# Patient Record
Sex: MALE | Race: WHITE | NOT HISPANIC OR LATINO | Employment: FULL TIME | URBAN - METROPOLITAN AREA
[De-identification: names, ages, dates, MRNs, and addresses within clinical notes are randomized per-mention and may not be internally consistent; named-entity substitution may affect disease eponyms.]

---

## 2017-02-20 DIAGNOSIS — E78.5 HYPERLIPIDEMIA LDL GOAL <130: ICD-10-CM

## 2017-02-20 NOTE — TELEPHONE ENCOUNTER
Atorvastatin 80 mg     Last Written Prescription Date: 01/18/2016  Last Fill Quantity: 90, # refills: 3  Last Office Visit with G, P or [facility] prescribing provider: 02/16/2016       Lab Results   Component Value Date    CHOL 281 11/28/2015     Lab Results   Component Value Date    HDL 49 11/28/2015     Lab Results   Component Value Date     11/28/2015     Lab Results   Component Value Date    TRIG 167 11/28/2015     Lab Results   Component Value Date    CHOLHDLRATIO 5.3 10/18/2014

## 2017-02-21 RX ORDER — ATORVASTATIN CALCIUM 80 MG/1
TABLET, FILM COATED ORAL
Qty: 30 TABLET | Refills: 0 | Status: SHIPPED | OUTPATIENT
Start: 2017-02-21 | End: 2017-04-27 | Stop reason: ALTCHOICE

## 2017-02-21 NOTE — TELEPHONE ENCOUNTER
Medication is being filled for 1 time refill only due to:  Patient needs labs FLP. Patient needs to be seen because it has been more than one year since last visit. please call to help schedule.  Denisse Nelson RN, BSN

## 2017-02-22 NOTE — TELEPHONE ENCOUNTER
Called and spoke to the patient he is aware that he needs labs and office visit before refill approved, he will call back to schedule  Amada Walker RT(R)

## 2017-03-31 ENCOUNTER — TELEPHONE (OUTPATIENT)
Dept: FAMILY MEDICINE | Facility: OTHER | Age: 42
End: 2017-03-31

## 2017-03-31 NOTE — TELEPHONE ENCOUNTER
Panel Management Review      Patient has the following on his problem list: None      Composite cancer screening  Chart review shows that this patient is due/due soon for the following None  Summary:    Patient is due/failing the following:   Lipids    Action needed:   Patient needs office visit for follow up and fasting labs.    Type of outreach:    Patient's wife answered. Said she'd take a message so asked her to have patient call back.  She said if this is in regards to his labs that he is trying to get in on the weekend to get his fasting labs done before he makes a follow up appointment with Dr. Almonte.  She said he's wokring on it.    Questions for provider review:    None                                                                                                                                    Skyla Bernardo, [facility]       Chart routed to Closed   .

## 2017-04-22 DIAGNOSIS — E78.5 HYPERLIPIDEMIA LDL GOAL <130: ICD-10-CM

## 2017-04-22 LAB
CHOLEST SERPL-MCNC: 243 MG/DL
HDLC SERPL-MCNC: 48 MG/DL
LDLC SERPL CALC-MCNC: 178 MG/DL
NONHDLC SERPL-MCNC: 195 MG/DL
TRIGL SERPL-MCNC: 85 MG/DL

## 2017-04-22 PROCEDURE — 36415 COLL VENOUS BLD VENIPUNCTURE: CPT | Performed by: FAMILY MEDICINE

## 2017-04-22 PROCEDURE — 80061 LIPID PANEL: CPT | Performed by: FAMILY MEDICINE

## 2017-04-23 NOTE — PROGRESS NOTES
Cholesterol is a bit better, but still not ideal.  Have you ever tried Crestor.  It might be a better choice than the Lipitor to keep this under control.  Let me know what you'd like to do.    Thanks,    Dr. Almonte

## 2017-04-27 ENCOUNTER — TELEPHONE (OUTPATIENT)
Dept: FAMILY MEDICINE | Facility: OTHER | Age: 42
End: 2017-04-27

## 2017-04-27 DIAGNOSIS — E78.5 HYPERLIPIDEMIA LDL GOAL <130: ICD-10-CM

## 2017-04-27 RX ORDER — ROSUVASTATIN CALCIUM 40 MG/1
40 TABLET, COATED ORAL DAILY
Qty: 30 TABLET | Refills: 1 | Status: SHIPPED | OUTPATIENT
Start: 2017-04-27 | End: 2017-07-08

## 2017-04-27 NOTE — TELEPHONE ENCOUNTER
Reason for Call:  Medication or medication refill:    Do you use a [facility]?  Name of the pharmacy and phone number for the current request:  [facility] - [phone]    Name of the medication requested: Crestor     Other request: Pt wife calling stating rec'd a letter from [facility], stating that the Crestor would be a better option vs Lipator.  She is inquiring if a script needs to be requested, or will pharmacy have it? Pt is fine with changing med.    Can we leave a detailed message on this number? YES    Phone number patient can be reached at: Cell number on file:    Telephone Information:   Mobile [phone]       Best Time: anytime    Call taken on 4/27/2017 at 9:28 AM by Snehal Nava

## 2017-04-27 NOTE — TELEPHONE ENCOUNTER
I have sent new Rx to [facility].  We should recheck labs in a couple months.  Let me know if problems with the change.

## 2017-07-08 DIAGNOSIS — E78.5 HYPERLIPIDEMIA LDL GOAL <130: ICD-10-CM

## 2017-07-10 NOTE — TELEPHONE ENCOUNTER
rosuvastatin (CRESTOR) 40 MG tablet     Last Written Prescription Date: 4/27/2017  Last Fill Quantity: 30, # refills: 1  Last Office Visit with G, [facility] or [facility] prescribing provider: 2/16/20116       Lab Results   Component Value Date    CHOL 243 04/22/2017     Lab Results   Component Value Date    HDL 48 04/22/2017     Lab Results   Component Value Date     04/22/2017     Lab Results   Component Value Date    TRIG 85 04/22/2017     Lab Results   Component Value Date    CHOLHDLRATIO 5.3 10/18/2014

## 2017-07-11 NOTE — TELEPHONE ENCOUNTER
Routing refill request to provider for review/approval because:  Labs out of range:  LDL  Patient needs to be seen because it has been more than 1 year since last office visit. LOV: 2/16/16    Patricia Guerrero RN, BSN

## 2017-07-12 RX ORDER — ROSUVASTATIN CALCIUM 40 MG/1
TABLET, COATED ORAL
Qty: 30 TABLET | Refills: 0 | Status: SHIPPED | OUTPATIENT
Start: 2017-07-12 | End: 2017-08-30

## 2017-07-12 NOTE — TELEPHONE ENCOUNTER
Your medication has been refilled.  Please schedule a fasting labs and office visit to follow up on how this medication is working for you before your next refill.  We need to be sure everything is working well and stable prior to further refills.

## 2017-08-12 DIAGNOSIS — E78.5 HYPERLIPEMIA: Primary | ICD-10-CM

## 2017-08-12 LAB
CHOLEST SERPL-MCNC: 154 MG/DL
HDLC SERPL-MCNC: 47 MG/DL
LDLC SERPL CALC-MCNC: 91 MG/DL
NONHDLC SERPL-MCNC: 107 MG/DL
TRIGL SERPL-MCNC: 79 MG/DL

## 2017-08-12 PROCEDURE — 80061 LIPID PANEL: CPT | Performed by: FAMILY MEDICINE

## 2017-08-12 PROCEDURE — 36415 COLL VENOUS BLD VENIPUNCTURE: CPT | Performed by: FAMILY MEDICINE

## 2017-08-12 NOTE — LETTER
Maverick Negrontz  [address] [address] [place] [id number]        August 14, 2017          Dear ,    We are writing to inform you of your test results.    Labs look much better.  Medication and lifestyle changes are working well at this time.     Resulted Orders   Lipid panel reflex to direct LDL   Result Value Ref Range    Cholesterol 154 <200 mg/dL    Triglycerides 79 <150 mg/dL    HDL Cholesterol 47 >39 mg/dL    LDL Cholesterol Calculated 91 <100 mg/dL      Comment:      Desirable:       <100 mg/dl    Non HDL Cholesterol 107 <130 mg/dL       If you have any questions or concerns, please call the clinic at the number listed above.       Sincerely,        Jovani Almonte MD

## 2017-08-14 NOTE — PROGRESS NOTES
Labs look much better.  Medication and lifestyle changes are working well at this time.    Have a nice day!    Dr. Almonte

## 2017-08-24 DIAGNOSIS — E78.5 HYPERLIPIDEMIA LDL GOAL <130: ICD-10-CM

## 2017-08-24 NOTE — TELEPHONE ENCOUNTER
rosuvastatin (CRESTOR) 40 MG tablet     Last Written Prescription Date: 7/12/17  Last Fill Quantity: 30, # refills: 0  Last Office Visit with G, P or [facility] prescribing provider: 2/16/16       Lab Results   Component Value Date    CHOL 154 08/12/2017     Lab Results   Component Value Date    HDL 47 08/12/2017     Lab Results   Component Value Date    LDL 91 08/12/2017     Lab Results   Component Value Date    TRIG 79 08/12/2017     Lab Results   Component Value Date    CHOLHDLRATIO 5.3 10/18/2014

## 2017-08-28 NOTE — TELEPHONE ENCOUNTER
Unable to refill until pt schedules visit for follow up.  We haven't seen him for over a year.  Once scheduled, will fill enough to get to that appointment.

## 2017-08-28 NOTE — TELEPHONE ENCOUNTER
rosuvastatin (CRESTOR) 40 MG tablet  Routing refill request to provider for review/approval because:  Coty given x1 and patient did not follow up, please advise - have completed lab work   Patient needs to be seen because:  Due for physical   Patient needs to be seen because it has been more than 1 year since last office visit. LOV 02/16/2016    Quyen Jackson RN, BSN

## 2017-08-28 NOTE — TELEPHONE ENCOUNTER
Spoke with patients wife, she states that patient is very busy at work, working 12-14 hour days for the next 3-4 weeks, she will have patient call to schedule appointment. He has completed lab work, he has been out of medication for 3 days now. I explained to her that once he schedules appointment we can refill to get him through to that appointment, she understands and will call to schedule  Shelby Walker RT (R)

## 2017-08-29 RX ORDER — ROSUVASTATIN CALCIUM 40 MG/1
TABLET, COATED ORAL
Qty: 30 TABLET | Refills: 0 | OUTPATIENT
Start: 2017-08-29

## 2017-08-29 NOTE — TELEPHONE ENCOUNTER
Please close encounter, patients wife is aware that appointment is needed  Shelby Walker RT (R)

## 2017-08-30 DIAGNOSIS — E78.5 HYPERLIPIDEMIA LDL GOAL <130: ICD-10-CM

## 2017-08-30 RX ORDER — ROSUVASTATIN CALCIUM 40 MG/1
40 TABLET, COATED ORAL DAILY
Qty: 30 TABLET | Refills: 0 | Status: SHIPPED | OUTPATIENT
Start: 2017-08-30 | End: 2017-10-05

## 2017-08-30 RX ORDER — ROSUVASTATIN CALCIUM 40 MG/1
TABLET, COATED ORAL
Qty: 30 TABLET | Refills: 0 | Status: CANCELLED | OUTPATIENT
Start: 2017-08-30

## 2017-08-30 NOTE — TELEPHONE ENCOUNTER
crestor     Last Written Prescription Date: 07/12/17  Last Fill Quantity: 30, # refills: 0  Last Office Visit with G, [facility] or  [facility] prescribing provider: 02/16/16  Next 5 appointments (look out 90 days)     Sep 21, 2017  3:15 PM CDT   [facility]    [address] [facility] [address] [phone]                   Lab Results   Component Value Date    CHOL 154 08/12/2017     Lab Results   Component Value Date    HDL 47 08/12/2017     Lab Results   Component Value Date    LDL 91 08/12/2017     Lab Results   Component Value Date    TRIG 79 08/12/2017     Lab Results   Component Value Date    CHOLHDLRATIO 5.3 10/18/2014

## 2017-08-30 NOTE — TELEPHONE ENCOUNTER
Patient made appointment for 9/21/17 with Dr Almonte would like to get enough to make it to appoinitment.  Please call

## 2017-10-05 DIAGNOSIS — E78.5 HYPERLIPIDEMIA LDL GOAL <130: ICD-10-CM

## 2017-10-10 RX ORDER — ROSUVASTATIN CALCIUM 40 MG/1
TABLET, COATED ORAL
Qty: 7 TABLET | Refills: 0 | Status: SHIPPED | OUTPATIENT
Start: 2017-10-10 | End: 2017-10-31

## 2017-10-31 ENCOUNTER — OFFICE VISIT (OUTPATIENT)
Dept: FAMILY MEDICINE | Facility: CLINIC | Age: 42
End: 2017-10-31
Payer: COMMERCIAL

## 2017-10-31 VITALS
OXYGEN SATURATION: 98 % | WEIGHT: 247 LBS | HEIGHT: 70 IN | HEART RATE: 66 BPM | BODY MASS INDEX: 35.36 KG/M2 | DIASTOLIC BLOOD PRESSURE: 68 MMHG | TEMPERATURE: 98.1 F | RESPIRATION RATE: 18 BRPM | SYSTOLIC BLOOD PRESSURE: 118 MMHG

## 2017-10-31 DIAGNOSIS — Z23 NEED FOR PROPHYLACTIC VACCINATION AND INOCULATION AGAINST INFLUENZA: ICD-10-CM

## 2017-10-31 DIAGNOSIS — Z82.49 FAMILY HISTORY OF ISCHEMIC HEART DISEASE: ICD-10-CM

## 2017-10-31 DIAGNOSIS — E78.5 HYPERLIPIDEMIA LDL GOAL <130: Primary | ICD-10-CM

## 2017-10-31 PROCEDURE — 90471 IMMUNIZATION ADMIN: CPT | Performed by: FAMILY MEDICINE

## 2017-10-31 PROCEDURE — 90686 IIV4 VACC NO PRSV 0.5 ML IM: CPT | Performed by: FAMILY MEDICINE

## 2017-10-31 PROCEDURE — 99213 OFFICE O/P EST LOW 20 MIN: CPT | Mod: 25 | Performed by: FAMILY MEDICINE

## 2017-10-31 RX ORDER — ROSUVASTATIN CALCIUM 40 MG/1
40 TABLET, COATED ORAL DAILY
Qty: 90 TABLET | Refills: 3 | Status: SHIPPED | OUTPATIENT
Start: 2017-10-31 | End: 2018-11-26

## 2017-10-31 ASSESSMENT — PAIN SCALES - GENERAL: PAINLEVEL: NO PAIN (0)

## 2017-10-31 NOTE — MR AVS SNAPSHOT
"              After Visit Summary   10/31/2017    Maverick Youngblood    MRN: [id number]           Patient Information     Date Of Birth          1975        Visit Information        Provider Department      10/31/2017 2:30 PM Quirino Meyers MD [facility]        Today's Diagnoses     Hyperlipidemia LDL goal <130           Follow-ups after your visit        Who to contact     If you have questions or need follow up information about today's clinic visit or your schedule please contact [facility] directly at [phone].  Normal or non-critical lab and imaging results will be communicated to you by [employer], letter or phone within 4 business days after the clinic has received the results. If you do not hear from us within 7 days, please contact the clinic through [employer] or phone. If you have a critical or abnormal lab result, we will notify you by phone as soon as possible.  Submit refill requests through [employer] or call your pharmacy and they will forward the refill request to us. Please allow 3 business days for your refill to be completed.          Additional Information About Your Visit        MyChart Information     [employer] gives you secure access to your electronic health record. If you see a primary care provider, you can also send messages to your care team and make appointments. If you have questions, please call your primary care clinic.  If you do not have a primary care provider, please call [phone] and they will assist you.        Care EveryWhere ID     This is your Care EveryWhere ID. This could be used by other organizations to access your [place] medical records  [id number]        Your Vitals Were     Pulse Temperature Respirations Height Pulse Oximetry BMI (Body Mass Index)    66 98.1  F (36.7  C) (Temporal) 18 5' 10\" (1.778 m) 98% 35.44 kg/m2       Blood Pressure from Last 3 Encounters:   10/31/17 118/68   02/16/16 122/70   01/18/16 120/72    Weight " from Last 3 Encounters:   10/31/17 247 lb (112 kg)   02/16/16 251 lb (113.9 kg)   01/18/16 258 lb (117 kg)              Today, you had the following     No orders found for display         Today's Medication Changes          These changes are accurate as of: 10/31/17  2:30 PM.  If you have any questions, ask your nurse or doctor.               Stop taking these medicines if you haven't already. Please contact your care team if you have questions.     ciprofloxacin 500 MG tablet   Commonly known as:  CIPRO   Stopped by:  Quirino Meyers MD                    Primary Care Provider Office Phone # Fax #    Quirino Meyers [phone] [address] [place] [address]        Equal Access to Services     [facility]: Hadii srinath guallpa hadasho Soomaali, waaxda luqadaha, qaybta kaalmada adeegyada, waxsumit mcknight . So [facility] [phone].    ATENCIÓN: Si habla español, tiene a madera disposición servicios gratuitos de asistencia lingüística. [facility] [phone].    We comply with applicable federal civil rights laws and [place] laws. We do not discriminate on the basis of race, color, national origin, age, disability, sex, sexual orientation, or gender identity.            Thank you!     Thank you for choosing [facility]  for your care. Our goal is always to provide you with excellent care. Hearing back from our patients is one way we can continue to improve our services. Please take a few minutes to complete the written survey that you may receive in the mail after your visit with us. Thank you!             Your Updated Medication List - Protect others around you: Learn how to safely use, store and throw away your medicines at www.disposemymeds.org.          This list is accurate as of: 10/31/17  2:30 PM.  Always use your most recent med list.                   Brand Name Dispense Instructions for use Diagnosis    rosuvastatin 40 MG tablet     CRESTOR    7 tablet    TAKE ONE TABLET BY MOUTH EVERY DAY    Hyperlipidemia LDL goal <130

## 2017-10-31 NOTE — NURSING NOTE
"Chief Complaint   Patient presents with     Lipids     medication refilled.        Initial /68  Pulse 66  Temp 98.1  F (36.7  C) (Temporal)  Resp 18  Ht 5' 10\" (1.778 m)  Wt 247 lb (112 kg)  SpO2 98%  BMI 35.44 kg/m2 Estimated body mass index is 35.44 kg/(m^2) as calculated from the following:    Height as of this encounter: 5' 10\" (1.778 m).    Weight as of this encounter: 247 lb (112 kg).  Medication Reconciliation: complete    "

## 2017-10-31 NOTE — PROGRESS NOTES
"  SUBJECTIVE:   Maverick Youngblood is a 41 year old male who presents to clinic today for the following health issues:      Hyperlipidemia Follow-Up      Rate your low fat/cholesterol diet?: good    Taking statin?  Yes, no muscle aches from statin    Other lipid medications/supplements?:  none        Amount of exercise or physical activity: 4-5 days/week for an average of 45-60 minutes    Problems taking medications regularly: No    Medication side effects: none    Diet: regular (no restrictions)        Problem list and histories reviewed & adjusted, as indicated.  Additional history: as documented    Reviewed and updated as needed this visit by clinical staffTobacco  Allergies  Meds  Problems  Med Hx  Surg Hx  Fam Hx  Soc Hx        Reviewed and updated as needed this visit by Provider  Allergies  Meds  Problems         New patient to me.  Establishing care as his other family sees me.  He is on Crestor for severe hyperlipidemia and strong family history of cardiac disease.  Has uncle that  at age 36 of MI.      Lab Results   Component Value Date    CHOL 154 2017     Lab Results   Component Value Date    HDL 47 2017     Lab Results   Component Value Date    LDL 91 2017     Lab Results   Component Value Date    TRIG 79 2017     Lab Results   Component Value Date    CHOLHDLRATIO 5.3 10/18/2014     Tolerating Crestor well.      ROS:  No fevers, chills, nausea, or vomiting.  No chest pain or shortness of breath, or dyspnea on exertion.    OBJECTIVE:                                                    /68  Pulse 66  Temp 98.1  F (36.7  C) (Temporal)  Resp 18  Ht 5' 10\" (1.778 m)  Wt 247 lb (112 kg)  SpO2 98%  BMI 35.44 kg/m2  Body mass index is 35.44 kg/(m^2).  GENERAL APPEARANCE: healthy, alert and no distress  RESP: lungs clear to auscultation - no rales, rhonchi or wheezes  CV: regular rates and rhythm, normal S1 S2, no S3 or S4 and no murmur, click or rub      "     ASSESSMENT/PLAN:                                                        ICD-10-CM    1. Hyperlipidemia LDL goal <130 E78.5 rosuvastatin (CRESTOR) 40 MG tablet     Lipid panel reflex to direct LDL Fasting   2. Family history of ischemic heart disease Z82.49    3. Need for prophylactic vaccination and inoculation against influenza Z23 FLU VAC, SPLIT VIRUS IM > 3 YO (QUADRIVALENT) [80497]     Vaccine Administration, Initial [16552]     PLAN:  1.  Crestor refilled for 1 year.  Next lipid really is due around 8/2018 but he can get it checked at any point he feels appropriate at or before that time and then follow-up with me for health care maintenance visit when he gets this checked.   Otherwise healthy.      Quirino Meyers MD   [facility]

## 2017-10-31 NOTE — PROGRESS NOTES

## 2018-11-26 DIAGNOSIS — E78.5 HYPERLIPIDEMIA LDL GOAL <130: ICD-10-CM

## 2018-11-28 RX ORDER — ROSUVASTATIN CALCIUM 40 MG/1
TABLET, COATED ORAL
Qty: 90 TABLET | Refills: 0 | Status: SHIPPED | OUTPATIENT
Start: 2018-11-28 | End: 2019-04-01

## 2018-11-28 NOTE — TELEPHONE ENCOUNTER
CRESTOR  Last Written Prescription Date:   10/31/17  Last Fill Quantity:90   # refills: 3   Last office visit: 10/31/2017 with prescribing provider:    Future Office Visit:  NONE  LDL goal < 130.  Last checked on 8/12/17 = 91  Due for fasting lipid check and office visit. Will refill for one time- 90 days with info to schedule appt in comment section of RX.............MIHIR Savage

## 2019-04-01 DIAGNOSIS — E78.5 HYPERLIPIDEMIA LDL GOAL <130: ICD-10-CM

## 2019-04-03 NOTE — TELEPHONE ENCOUNTER
"Requested Prescriptions   Pending Prescriptions Disp Refills     rosuvastatin (CRESTOR) 40 MG tablet [Pharmacy Med Name: ROSUVASTATIN CALCIUM 40MG TABS] 90 tablet 0    Last Written Prescription Date:  11/28/18  Last Fill Quantity: 90,  # refills: 0   Last office visit: 10/31/2017 with prescribing provider:     Future Office Visit:     Sig: TAKE ONE TABLET BY MOUTH ONCE DAILY (DUE FOR LIPID CHECK AND OFFICE VISIT)    Statins Protocol Failed - 4/1/2019  2:14 PM       Failed - LDL on file in past 12 months    Recent Labs   Lab Test 08/12/17  0814   LDL 91            Failed - Recent (12 mo) or future (30 days) visit within the authorizing provider's specialty    Patient had office visit in the last 12 months or has a visit in the next 30 days with authorizing provider or within the authorizing provider's specialty.  See \"Patient Info\" tab in inbasket, or \"Choose Columns\" in Meds & Orders section of the refill encounter.             Passed - No abnormal creatine kinase in past 12 months    Recent Labs   Lab Test 02/16/16  1103   *               Passed - Medication is active on med list       Passed - Patient is age 18 or older        Routing refill request to provider for review/approval because:  Labs not current:  LDL  Patient needs to be seen because it has been more than 1 year since last office visit.  T'd up 1 month for provider review.    Will forward to schedulers to schedule patient for OV.  Jory Woods RN          "

## 2019-04-04 RX ORDER — ROSUVASTATIN CALCIUM 40 MG/1
40 TABLET, COATED ORAL DAILY
Qty: 30 TABLET | Refills: 0 | Status: SHIPPED | OUTPATIENT
Start: 2019-04-04 | End: 2019-06-20

## 2019-06-20 DIAGNOSIS — E78.5 HYPERLIPIDEMIA LDL GOAL <130: ICD-10-CM

## 2019-06-21 RX ORDER — ROSUVASTATIN CALCIUM 40 MG/1
TABLET, COATED ORAL
Qty: 30 TABLET | Refills: 0 | Status: SHIPPED | OUTPATIENT
Start: 2019-06-21 | End: 2019-08-13

## 2019-06-21 NOTE — TELEPHONE ENCOUNTER
Refilled x1.  Needs appointment for further refills.  He was already told this 2 months ago with his last refill.  Will have staff notify patient.  PLEASE CONFIRM THAT HE RECEIVED THIS MESSAGE REGARDING NEED FOR APPOINTMENT.      Quirino Meyers MD

## 2019-06-21 NOTE — TELEPHONE ENCOUNTER
"Requested Prescriptions   Pending Prescriptions Disp Refills     rosuvastatin (CRESTOR) 40 MG tablet [Pharmacy Med Name: ROSUVASTATIN CALCIUM 40MG TABS] 30 tablet 0     Sig: TAKE ONE TABLET BY MOUTH ONCE DAILY (APPOINTMENT NEEDED FOR ADDITIONAL REFILLS)   Last Written Prescription Date:  4/4/2019  Last Fill Quantity: 30,  # refills: 0   Last office visit: 10/31/2017 with prescribing provider:  Luigi   Future Office Visit:        Statins Protocol Failed - 6/20/2019 12:53 PM        Failed - LDL on file in past 12 months     Recent Labs   Lab Test 08/12/17  0814   LDL 91           Failed - Recent (12 mo) or future (30 days) visit within the authorizing provider's specialty     Patient had office visit in the last 12 months or has a visit in the next 30 days with authorizing provider or within the authorizing provider's specialty.  See \"Patient Info\" tab in inbasket, or \"Choose Columns\" in Meds & Orders section of the refill encounter.            Passed - No abnormal creatine kinase in past 12 months     Recent Labs   Lab Test 02/16/16  1103   *            Passed - Medication is active on med list        Passed - Patient is age 18 or older      Routing refill request to provider for review/approval because:  Coty given x1 and patient did not follow up, please advise  Labs not current:  LDL, CKT  Patient needs to be seen because it has been more than 1 year since last office visit.    Will forward to schedulers to schedule patient for overdue yearly routine OV.     Vidya Antonio RN      "

## 2019-06-24 NOTE — TELEPHONE ENCOUNTER
Spoke to pt. Informed he is due for an appt before anymore refills. He is going to check schedules with his wife and will call back to make appt.   Thank you,  Amada Allred- Patient Representative

## 2019-08-13 ENCOUNTER — OFFICE VISIT (OUTPATIENT)
Dept: FAMILY MEDICINE | Facility: CLINIC | Age: 44
End: 2019-08-13
Payer: COMMERCIAL

## 2019-08-13 VITALS
TEMPERATURE: 98.5 F | RESPIRATION RATE: 18 BRPM | HEIGHT: 70 IN | OXYGEN SATURATION: 99 % | BODY MASS INDEX: 37.94 KG/M2 | SYSTOLIC BLOOD PRESSURE: 126 MMHG | DIASTOLIC BLOOD PRESSURE: 74 MMHG | WEIGHT: 265 LBS | HEART RATE: 76 BPM

## 2019-08-13 DIAGNOSIS — E78.5 HYPERLIPIDEMIA LDL GOAL <130: Primary | ICD-10-CM

## 2019-08-13 DIAGNOSIS — E66.01 MORBID OBESITY DUE TO EXCESS CALORIES (H): ICD-10-CM

## 2019-08-13 DIAGNOSIS — Z82.49 FAMILY HISTORY OF ISCHEMIC HEART DISEASE: ICD-10-CM

## 2019-08-13 DIAGNOSIS — Z11.4 ENCOUNTER FOR SCREENING FOR HUMAN IMMUNODEFICIENCY VIRUS (HIV): ICD-10-CM

## 2019-08-13 LAB
CHOLEST SERPL-MCNC: 214 MG/DL
HDLC SERPL-MCNC: 50 MG/DL
LDLC SERPL CALC-MCNC: 142 MG/DL
NONHDLC SERPL-MCNC: 164 MG/DL
TRIGL SERPL-MCNC: 109 MG/DL

## 2019-08-13 PROCEDURE — 99213 OFFICE O/P EST LOW 20 MIN: CPT | Performed by: FAMILY MEDICINE

## 2019-08-13 PROCEDURE — 87389 HIV-1 AG W/HIV-1&-2 AB AG IA: CPT | Performed by: FAMILY MEDICINE

## 2019-08-13 PROCEDURE — 36415 COLL VENOUS BLD VENIPUNCTURE: CPT | Performed by: FAMILY MEDICINE

## 2019-08-13 PROCEDURE — 80061 LIPID PANEL: CPT | Performed by: FAMILY MEDICINE

## 2019-08-13 RX ORDER — ROSUVASTATIN CALCIUM 40 MG/1
40 TABLET, COATED ORAL DAILY
Qty: 90 TABLET | Refills: 3 | Status: SHIPPED | OUTPATIENT
Start: 2019-08-13 | End: 2020-09-01

## 2019-08-13 ASSESSMENT — PAIN SCALES - GENERAL: PAINLEVEL: NO PAIN (0)

## 2019-08-13 ASSESSMENT — MIFFLIN-ST. JEOR: SCORE: 2108.28

## 2019-08-13 NOTE — PROGRESS NOTES
"Subjective     Maverick Youngblood is a 43 year old male who presents to clinic today for the following health issues:    HPI   Medication Followup / refill     Taking Medication as prescribed: yes    Side Effects:  None    Medication Helping Symptoms:  yes             Has been on Crestor 40 mg for a couple of years. No concerns on medication.  No side effects.  due for lipid testing.  Not fasting today but never came back for fasting test when he was last seen almost 2 years ago.     Reviewed and updated as needed this visit by Provider  Tobacco  Allergies  Meds  Problems  Med Hx  Surg Hx  Fam Hx         Review of Systems   ROS COMP: Constitutional, HEENT, cardiovascular, pulmonary, gi and gu systems are negative, except as otherwise noted.      Objective    /74   Pulse 76   Temp 98.5  F (36.9  C) (Temporal)   Resp 18   Ht 1.778 m (5' 10\")   Wt 120.2 kg (265 lb)   SpO2 99%   BMI 38.02 kg/m    Body mass index is 38.02 kg/m .  Physical Exam   Constitutional: He appears well-developed and well-nourished.   Cardiovascular: Normal rate, regular rhythm, S1 normal, S2 normal and normal heart sounds.   No murmur heard.  Pulmonary/Chest: Effort normal and breath sounds normal. No respiratory distress. He has no wheezes. He has no rhonchi. He has no rales.   Neurological: He is alert.                  Assessment & Plan     ASSESSMENT/ORDERS:    ICD-10-CM    1. Hyperlipidemia LDL goal <130 E78.5 Lipid panel reflex to direct LDL Non-fasting     rosuvastatin (CRESTOR) 40 MG tablet   2. Family history of ischemic heart disease Z82.49    3. Morbid obesity due to excess calories (H) E66.01    4. Encounter for screening for human immunodeficiency virus (HIV) Z11.4 HIV Antigen Antibody Combo     PLAN:  1.  Crestor refilled.  nonfasting lipid ordered today.  If way off, would recommend repeat fasting lipid.  We did discuss his weight in relation to his cardiovascular risk and I recommended he increase exercise and " "watch his caloric intake.  he can follow-up at a future time if he would like to discuss weight loss in more detail.  2.  He was interested in CDC recommended HIV screening.       BMI:   Estimated body mass index is 38.02 kg/m  as calculated from the following:    Height as of this encounter: 1.778 m (5' 10\").    Weight as of this encounter: 120.2 kg (265 lb).   Weight management plan: Discussed healthy diet and exercise guidelines            Return in about 1 year (around 8/13/2020) for medication recheck.    Quirino Meyers MD  [facility]    "

## 2019-08-14 DIAGNOSIS — E78.5 HYPERLIPIDEMIA LDL GOAL <130: ICD-10-CM

## 2019-08-14 LAB — HIV 1+2 AB+HIV1 P24 AG SERPL QL IA: NONREACTIVE

## 2019-08-14 RX ORDER — ROSUVASTATIN CALCIUM 40 MG/1
TABLET, COATED ORAL
Qty: 30 TABLET | Refills: 0 | OUTPATIENT
Start: 2019-08-14

## 2019-08-14 NOTE — TELEPHONE ENCOUNTER
Crestor  Prescription was sent 8/13/19 for #90 with 3 refills.  Pharmacy notified via E-Prescribe refusal.     MIHIR Savage

## 2019-08-28 NOTE — RESULT ENCOUNTER NOTE
Bill,  Your results show that your cholesterol is still elevated.  That may be from not fasting or if you had any sort of gap in your medication use over the past few weeks.  You are currently on the max dose of Crestor so what I would recommend is working on weight management and your aerobic exercise like we discussed at your office visit.  Please let me know if you have any questions.    Sincerely,  Dr. Meyers

## 2020-09-01 DIAGNOSIS — E78.5 HYPERLIPIDEMIA LDL GOAL <130: ICD-10-CM

## 2020-09-01 RX ORDER — ROSUVASTATIN CALCIUM 40 MG/1
40 TABLET, COATED ORAL DAILY
Qty: 90 TABLET | Refills: 0 | Status: SHIPPED | OUTPATIENT
Start: 2020-09-01 | End: 2020-12-14

## 2020-09-01 NOTE — TELEPHONE ENCOUNTER
Routing to schedulers to set up F2F appointment for patient for yearly/lipids.  Patient has been given #90 to get to appointment per triage protocol.  Jory Woods, JON, RN

## 2020-12-14 DIAGNOSIS — E78.5 HYPERLIPIDEMIA LDL GOAL <130: ICD-10-CM

## 2020-12-14 RX ORDER — ROSUVASTATIN CALCIUM 40 MG/1
TABLET, COATED ORAL
Qty: 30 TABLET | Refills: 0 | Status: SHIPPED | OUTPATIENT
Start: 2020-12-14 | End: 2021-01-07

## 2020-12-14 NOTE — LETTER
December 15, 2020      Maverick Youngblood  [address] [address] [place] [id number]        Dear Maverick,     You are due to be seen to recheck your meds.       Sincerely,        Quirino Meyers MD

## 2020-12-14 NOTE — TELEPHONE ENCOUNTER
Called and LM for patient to call back. Please relay message below and help schedule yearly exam.   Bridgette Marroquin MA

## 2020-12-14 NOTE — TELEPHONE ENCOUNTER
Routing to team to set up F2F appointment for patient for yearly.  He was informed last refill that he needed an appointment.      Routing refill request to provider for review/approval because:  Labs not current:  LDL  Patient needs to be seen because it has been more than 1 year since last office visit.    Last Written Prescription Date:  9/1/2020  Last Fill Quantity: 90,  # refills: 0   Last office visit: 8/13/2019 with prescribing provider:     Future Office Visit:      Jory Woods, JON, RN

## 2020-12-18 ENCOUNTER — TELEPHONE (OUTPATIENT)
Dept: FAMILY MEDICINE | Facility: CLINIC | Age: 45
End: 2020-12-18

## 2020-12-18 DIAGNOSIS — E78.5 HYPERLIPIDEMIA LDL GOAL <130: Primary | ICD-10-CM

## 2020-12-18 NOTE — TELEPHONE ENCOUNTER
Spouse calling patient is running low on his chol medication. Wants to schedule a lab only appointment for

## 2020-12-19 DIAGNOSIS — E78.5 HYPERLIPIDEMIA LDL GOAL <130: ICD-10-CM

## 2020-12-19 LAB
CHOLEST SERPL-MCNC: 181 MG/DL
ERYTHROCYTE [DISTWIDTH] IN BLOOD BY AUTOMATED COUNT: 12.4 % (ref 10–15)
HCT VFR BLD AUTO: 46.1 % (ref 40–53)
HDLC SERPL-MCNC: 51 MG/DL
HGB BLD-MCNC: 15.7 G/DL (ref 13.3–17.7)
LDLC SERPL CALC-MCNC: 113 MG/DL
MCH RBC QN AUTO: 31.5 PG (ref 26.5–33)
MCHC RBC AUTO-ENTMCNC: 34.1 G/DL (ref 31.5–36.5)
MCV RBC AUTO: 92 FL (ref 78–100)
NONHDLC SERPL-MCNC: 130 MG/DL
PLATELET # BLD AUTO: 147 10E9/L (ref 150–450)
RBC # BLD AUTO: 4.99 10E12/L (ref 4.4–5.9)
TRIGL SERPL-MCNC: 87 MG/DL
WBC # BLD AUTO: 4.7 10E9/L (ref 4–11)

## 2020-12-19 PROCEDURE — 80061 LIPID PANEL: CPT | Performed by: FAMILY MEDICINE

## 2020-12-19 PROCEDURE — 85027 COMPLETE CBC AUTOMATED: CPT | Performed by: FAMILY MEDICINE

## 2020-12-19 PROCEDURE — 36415 COLL VENOUS BLD VENIPUNCTURE: CPT | Performed by: FAMILY MEDICINE

## 2020-12-21 ENCOUNTER — MYC MEDICAL ADVICE (OUTPATIENT)
Dept: FAMILY MEDICINE | Facility: CLINIC | Age: 45
End: 2020-12-21

## 2020-12-22 NOTE — RESULT ENCOUNTER NOTE
Bill,  Your results show your LDL (bad cholesterol) is still high.  You should come in for office visit to discuss this and see what you can do to get this lower.  Please let me know if you have any questions.    Sincerely,  Dr. Meyers

## 2021-01-07 ENCOUNTER — OFFICE VISIT (OUTPATIENT)
Dept: FAMILY MEDICINE | Facility: CLINIC | Age: 46
End: 2021-01-07
Payer: COMMERCIAL

## 2021-01-07 VITALS
TEMPERATURE: 98 F | BODY MASS INDEX: 38.54 KG/M2 | RESPIRATION RATE: 12 BRPM | DIASTOLIC BLOOD PRESSURE: 80 MMHG | HEART RATE: 72 BPM | SYSTOLIC BLOOD PRESSURE: 118 MMHG | WEIGHT: 268.6 LBS | OXYGEN SATURATION: 98 %

## 2021-01-07 DIAGNOSIS — Z82.49 FAMILY HISTORY OF ISCHEMIC HEART DISEASE: ICD-10-CM

## 2021-01-07 DIAGNOSIS — E78.5 HYPERLIPIDEMIA LDL GOAL <130: Primary | ICD-10-CM

## 2021-01-07 PROCEDURE — 99213 OFFICE O/P EST LOW 20 MIN: CPT | Performed by: FAMILY MEDICINE

## 2021-01-07 RX ORDER — ROSUVASTATIN CALCIUM 40 MG/1
40 TABLET, COATED ORAL DAILY
Qty: 90 TABLET | Refills: 4 | Status: SHIPPED | OUTPATIENT
Start: 2021-01-07 | End: 2022-05-27

## 2021-01-07 ASSESSMENT — PAIN SCALES - GENERAL: PAINLEVEL: NO PAIN (0)

## 2021-01-07 NOTE — PROGRESS NOTES
Assessment & Plan     ASSESSMENT/ORDERS:    ICD-10-CM    1. Hyperlipidemia LDL goal <130  E78.5 rosuvastatin (CRESTOR) 40 MG tablet   2. Family history of ischemic heart disease  Z82.49      PLAN:  1.  Lipid management stable.  Medication refilled.                              Return in about 1 year (around 1/7/2022) for next preventative visit (Physical).    Quirino Meyers MD  [facility] ANASTASIA Che is a 45 year old who presents to clinic today for the following health issues     HPI       Hyperlipidemia Follow-Up      Are you regularly taking any medication or supplement to lower your cholesterol?   Yes- crestor    Are you having muscle aches or other side effects that you think could be caused by your cholesterol lowering medication?  No      How many servings of fruits and vegetables do you eat daily?  0-1    On average, how many sweetened beverages do you drink each day (Examples: soda, juice, sweet tea, etc.  Do NOT count diet or artificially sweetened beverages)?   1    How many days per week do you exercise enough to make your heart beat faster? 3 or less    How many minutes a day do you exercise enough to make your heart beat faster? 9 or less  How many days per week do you miss taking your medication? 1    What makes it hard for you to take your medications?  remembering to take      No concerns regarding medication.      Review of Systems         Objective    There were no vitals taken for this visit.  There is no height or weight on file to calculate BMI.  Physical Exam  Constitutional:       Appearance: He is well-developed. He is obese.   Cardiovascular:      Rate and Rhythm: Normal rate and regular rhythm.      Heart sounds: Normal heart sounds, S1 normal and S2 normal. No murmur.   Pulmonary:      Effort: Pulmonary effort is normal. No respiratory distress.      Breath sounds: Normal breath sounds. No wheezing, rhonchi or rales.   Neurological:      Mental  Status: He is alert.

## 2021-01-09 ENCOUNTER — HEALTH MAINTENANCE LETTER (OUTPATIENT)
Age: 46
End: 2021-01-09

## 2021-03-25 ENCOUNTER — OFFICE VISIT (OUTPATIENT)
Dept: ORTHOPEDICS | Facility: CLINIC | Age: 46
End: 2021-03-25
Payer: COMMERCIAL

## 2021-03-25 ENCOUNTER — ANCILLARY PROCEDURE (OUTPATIENT)
Dept: GENERAL RADIOLOGY | Facility: CLINIC | Age: 46
End: 2021-03-25
Attending: ORTHOPAEDIC SURGERY
Payer: COMMERCIAL

## 2021-03-25 VITALS
WEIGHT: 268.3 LBS | SYSTOLIC BLOOD PRESSURE: 122 MMHG | BODY MASS INDEX: 39.74 KG/M2 | DIASTOLIC BLOOD PRESSURE: 84 MMHG | HEIGHT: 69 IN

## 2021-03-25 DIAGNOSIS — M25.511 RIGHT SHOULDER PAIN: ICD-10-CM

## 2021-03-25 DIAGNOSIS — M75.41 SUBACROMIAL IMPINGEMENT OF RIGHT SHOULDER: Primary | ICD-10-CM

## 2021-03-25 PROCEDURE — 99203 OFFICE O/P NEW LOW 30 MIN: CPT | Mod: 25 | Performed by: ORTHOPAEDIC SURGERY

## 2021-03-25 PROCEDURE — 73030 X-RAY EXAM OF SHOULDER: CPT | Mod: TC | Performed by: RADIOLOGY

## 2021-03-25 PROCEDURE — 20610 DRAIN/INJ JOINT/BURSA W/O US: CPT | Mod: RT | Performed by: ORTHOPAEDIC SURGERY

## 2021-03-25 RX ORDER — TRIAMCINOLONE ACETONIDE 40 MG/ML
40 INJECTION, SUSPENSION INTRA-ARTICULAR; INTRAMUSCULAR ONCE
Status: COMPLETED | OUTPATIENT
Start: 2021-03-25 | End: 2021-03-25

## 2021-03-25 RX ADMIN — TRIAMCINOLONE ACETONIDE 40 MG: 40 INJECTION, SUSPENSION INTRA-ARTICULAR; INTRAMUSCULAR at 15:35

## 2021-03-25 ASSESSMENT — PAIN SCALES - GENERAL: PAINLEVEL: MILD PAIN (2)

## 2021-03-25 ASSESSMENT — MIFFLIN-ST. JEOR: SCORE: 2097.38

## 2021-03-25 NOTE — PROGRESS NOTES
Clinic Administered Medication Documentation      Injection Documentation     Injection was administered by provider (please see MAR for given by information). Please see MAR and medication order for additional information.     Site: Joint injection   Medication Used: Kenalog 40mg   Expiration Date:  10/2022      The following medication was given by Sharad Shaw D.O.:     MEDICATION: Kenalog 40mg/1ml  ROUTE: Joint Injection  SITE: right shoulder  DOSE: 1 mL  LOT #: [id number]  : [employer]  EXPIRATION DATE:  10/2022  NDC: [address]-9

## 2021-03-25 NOTE — PROGRESS NOTES
ORTHOPEDIC CONSULT      Chief Complaint: Maverick Youngblood is a 45 year old male who is being seen for   Chief Complaint   Patient presents with     Shoulder Pain     right shoulder pain     Consult     ref: Dr. Jones       History of Present Illness:   Maverick Youngblood is a 45 year old male who is seen in consultation at the request of Juliet for evaluation of right shoulder pain.    Complains of shoulder pain for approximately 1 month.  No specific injuries or traumas.  Worse with overhead reaching.  He is also had some neck/trapezial area tenderness.  Nonradiating.  No peripheral numbness and tingling.  He is attempted some Aleve and some treatments with no success.  He is never had any shoulder issues in the past.  No surgeries or injections.  Rates pain is moderate to severe.  When he tried to throw a ball at 1 point cause severe pain.      Patient's past medical, surgical, social and family histories reviewed.     No past medical history on file.    No past surgical history on file.    Medications:  rosuvastatin (CRESTOR) 40 MG tablet, Take 1 tablet (40 mg) by mouth daily    No current facility-administered medications on file prior to visit.       No Known Allergies    Social History     Occupational History     Occupation: [employer])     Employer: [employer]   Tobacco Use     Smoking status: Former Smoker     Packs/day: 1.00     Years: 10.00     Pack years: 10.00     Quit date: 8/30/2005     Years since quitting: 15.5     Smokeless tobacco: Former User   Substance and Sexual Activity     Alcohol use: Yes     Comment: 2 drinks/week     Drug use: No     Sexual activity: Yes     Partners: Female     Birth control/protection: I.U.D.       Family History   Problem Relation Age of Onset     Lipids Mother      Lipids Maternal Grandmother      Unknown/Adopted Maternal Grandfather      Lipids Maternal Uncle      C.A.D. Maternal Uncle 36       REVIEW OF SYSTEMS  10 point review systems performed otherwise  "negative as noted as per history of present illness.    Physical Exam:  Vitals: /84   Ht 1.753 m (5' 9\")   Wt 121.7 kg (268 lb 4.8 oz)   BMI 39.62 kg/m    BMI= Body mass index is 39.62 kg/m .  Constitutional: healthy, alert and no acute distress   Psychiatric: mentation appears normal and affect normal/bright  NEURO: no focal deficits  RESP: Normal with easy respirations and no use of accessory muscles to breathe, no audible wheezing or retractions  CV: RUE:  no edema         Regular rate and rhythm by palpation  SKIN: No erythema, rashes, excoriation, or breakdown. No evidence of infection.   JOINT/EXTREMITIES:right shoulder: No gross deformity other than some protracted shoulder posture.  No focal areas of tenderness including over the AC joint.  He has full active range of motion other than some limitations of terminal internal rotation.  He has no focal rotator cuff weakness.  Positive Woo.  No bicipital findings.  He does have a positive crossover.  Peripheral distal neurovascular is intact to light touch.   Lymph: No peripheral edema  GAIT: not tested     Diagnostic Modalities:  right shoulder X-ray: Well preserved glenohumeral articular space. No fracture, dislocation and or lesion. , type III acromion  Independent visualization of the images was performed.      Impression: right shoulder subacromial impingement/bursitis tendinitis    Plan:  All of the above pertinent physical exam and imaging modalities findings was reviewed with Maverick.                                          INJECTION PROCEDURE:  The patient was counseled about an  injection, including discussion of risks (including infection), contents of the injection, rationale for performing the injection, and expected benefits of the injection. The skin was prepped with alcohol and betadine and then utilizing sterile technique an injection of the right shoulder subacromial space from the posterolateral approach  was performed. The " injection consisted 1ml of Kenalog (40mg per 1 ml) mixed with 3ml of 0.5% Marcaine. The patient tolerated the injection well, and there were no complications. The injection site was covered with a Band-Aid. The injection was performed by Clarence Shaw D.O.    Options discussed.  He has no focal rotator cuff weakness on exam.  No history of trauma.  He works a lot overhead.  Recommend the steroid injection to help with the discomfort.  Importantly recommend physical therapy to address underlying mechanics.  He is agreeable to plan.    We also discussed the injection may take up to 2 weeks to work.  However if he still notices issues at 2 weeks he should return and I would consider an AC joint steroid injection.    Return to clinic 4-6 , week(s), PRN, or sooner as needed for changes.  Re-x-ray on return: No    Clarnece Shaw D.O.

## 2021-03-25 NOTE — LETTER
3/25/2021         RE: Maverick Youngblood  [address] [facility] [address]        Dear Colleague,    Thank you for referring your patient, Maverick Youngblood, to the [facility]. Please see a copy of my visit note below.    ORTHOPEDIC CONSULT      Chief Complaint: Maverick Youngblood is a 45 year old male who is being seen for   Chief Complaint   Patient presents with     Shoulder Pain     right shoulder pain     Consult     ref: Dr. Jones       History of Present Illness:   Maverick Youngblood is a 45 year old male who is seen in consultation at the request of Juliet for evaluation of right shoulder pain.    Complains of shoulder pain for approximately 1 month.  No specific injuries or traumas.  Worse with overhead reaching.  He is also had some neck/trapezial area tenderness.  Nonradiating.  No peripheral numbness and tingling.  He is attempted some Aleve and some treatments with no success.  He is never had any shoulder issues in the past.  No surgeries or injections.  Rates pain is moderate to severe.  When he tried to throw a ball at 1 point cause severe pain.      Patient's past medical, surgical, social and family histories reviewed.     No past medical history on file.    No past surgical history on file.    Medications:  rosuvastatin (CRESTOR) 40 MG tablet, Take 1 tablet (40 mg) by mouth daily    No current facility-administered medications on file prior to visit.       No Known Allergies    Social History     Occupational History     Occupation: [employer]     Employer: [employer]   Tobacco Use     Smoking status: Former Smoker     Packs/day: 1.00     Years: 10.00     Pack years: 10.00     Quit date: 8/30/2005     Years since quitting: 15.5     Smokeless tobacco: Former User   Substance and Sexual Activity     Alcohol use: Yes     Comment: 2 drinks/week     Drug use: No     Sexual activity: Yes     Partners: Female     Birth control/protection: I.U.D.       Family  "History   Problem Relation Age of Onset     Lipids Mother      Lipids Maternal Grandmother      Unknown/Adopted Maternal Grandfather      Lipids Maternal Uncle      C.A.D. Maternal Uncle 36       REVIEW OF SYSTEMS  10 point review systems performed otherwise negative as noted as per history of present illness.    Physical Exam:  Vitals: /84   Ht 1.753 m (5' 9\")   Wt 121.7 kg (268 lb 4.8 oz)   BMI 39.62 kg/m    BMI= Body mass index is 39.62 kg/m .  Constitutional: healthy, alert and no acute distress   Psychiatric: mentation appears normal and affect normal/bright  NEURO: no focal deficits  RESP: Normal with easy respirations and no use of accessory muscles to breathe, no audible wheezing or retractions  CV: RUE:  no edema         Regular rate and rhythm by palpation  SKIN: No erythema, rashes, excoriation, or breakdown. No evidence of infection.   JOINT/EXTREMITIES:right shoulder: No gross deformity other than some protracted shoulder posture.  No focal areas of tenderness including over the AC joint.  He has full active range of motion other than some limitations of terminal internal rotation.  He has no focal rotator cuff weakness.  Positive Woo.  No bicipital findings.  He does have a positive crossover.  Peripheral distal neurovascular is intact to light touch.   Lymph: No peripheral edema  GAIT: not tested     Diagnostic Modalities:  right shoulder X-ray: Well preserved glenohumeral articular space. No fracture, dislocation and or lesion. , type III acromion  Independent visualization of the images was performed.      Impression: right shoulder subacromial impingement/bursitis tendinitis    Plan:  All of the above pertinent physical exam and imaging modalities findings was reviewed with Maverick.                                          INJECTION PROCEDURE:  The patient was counseled about an  injection, including discussion of risks (including infection), contents of the injection, rationale for " performing the injection, and expected benefits of the injection. The skin was prepped with alcohol and betadine and then utilizing sterile technique an injection of the right shoulder subacromial space from the posterolateral approach  was performed. The injection consisted 1ml of Kenalog (40mg per 1 ml) mixed with 3ml of 0.5% Marcaine. The patient tolerated the injection well, and there were no complications. The injection site was covered with a Band-Aid. The injection was performed by Clarence Shaw D.O.    Options discussed.  He has no focal rotator cuff weakness on exam.  No history of trauma.  He works a lot overhead.  Recommend the steroid injection to help with the discomfort.  Importantly recommend physical therapy to address underlying mechanics.  He is agreeable to plan.    We also discussed the injection may take up to 2 weeks to work.  However if he still notices issues at 2 weeks he should return and I would consider an AC joint steroid injection.    Return to clinic 4-6 , week(s), PRN, or sooner as needed for changes.  Re-x-ray on return: No    Clarence Shaw D.O.    Clinic Administered Medication Documentation      Injection Documentation     Injection was administered by provider (please see MAR for given by information). Please see MAR and medication order for additional information.     Site: Joint injection   Medication Used: Kenalog 40mg   Expiration Date:  10/2022      The following medication was given by Sharad Shaw D.O.:     MEDICATION: Kenalog 40mg/1ml  ROUTE: Joint Injection  SITE: right shoulder  DOSE: 1 mL  LOT #: [id number]  : [employer]  EXPIRATION DATE:  10/2022  NDC: [address]-2                        Again, thank you for allowing me to participate in the care of your patient.        Sincerely,        Sharad Shaw, DO

## 2021-03-29 ENCOUNTER — HOSPITAL ENCOUNTER (OUTPATIENT)
Dept: PHYSICAL THERAPY | Facility: CLINIC | Age: 46
Setting detail: THERAPIES SERIES
End: 2021-03-29
Attending: ORTHOPAEDIC SURGERY
Payer: COMMERCIAL

## 2021-03-29 DIAGNOSIS — M75.41 SUBACROMIAL IMPINGEMENT OF RIGHT SHOULDER: ICD-10-CM

## 2021-03-29 PROCEDURE — 97110 THERAPEUTIC EXERCISES: CPT | Mod: GP | Performed by: PHYSICAL THERAPIST

## 2021-03-29 PROCEDURE — 97161 PT EVAL LOW COMPLEX 20 MIN: CPT | Mod: GP | Performed by: PHYSICAL THERAPIST

## 2021-03-29 NOTE — PROGRESS NOTES
03/29/21 1510   General Information   Type of Visit Initial OP Ortho PT Evaluation   Referring Physician Sharad Shaw, DO   Patient/Family Goals Statement To be able to sleep and work with out pain   Orders Evaluate and Treat   Date of Order 03/25/21   Certification Required? No  ( [place]   Medical Diagnosis Subacromial impingement of right shoulder M75.41    Surgical/Medical history reviewed Yes   Body Part(s)   Body Part(s) Shoulder   Presentation and Etiology   Pertinent history of current problem (include personal factors and/or comorbidities that impact the POC) Pain started about 6 weeks ago.  At first was killing his neck, then when he pulled back to throw baseball he had pain and  decreased strength in his arm. He tried chiro for neck and shoulder, helped a little. Followed up with MD and shot really helped.  Since injection 3/25 he no longer has constant pain and now he can sleep a little better.  Works above head, .  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  Work today every once in a while he would feel a twinge in there (shoots from shoulder up into neck.)  Neck is a lot better too.  (pain is still R side of neck and R shoulder.)  has been taking it easier since the shot.  Pain with ER and wind up to throw but ok overhead.  PMHX noncontributory. Takes Crestor for HTN.   Impairments A. Pain;D. Decreased ROM;E. Decreased flexibility;F. Decreased strength and endurance   Functional Limitations perform activities of daily living;perform required work activities;perform desired leisure / sports activities   Symptom Location R shoulder and R neck   How/Where did it occur From insidious onset;With repetition/overuse   Onset date of current episode/exacerbation 02/01/21   Chronicity New   Pain rating (0-10 point scale) Best (/10);Worst (/10)   Best (/10) 1   Worst (/10) 7   Pain quality A. Sharp;B. Dull;C. Aching   Frequency of pain/symptoms C. With activity    Pain/symptoms are: Worse during the night  (rolling or sleeping on it)   Pain/symptoms exacerbated by C. Lifting;D. Carrying;G. Certain positions;H. Overhead reach;J. ADL;K. Home tasks;L. Work tasks   Pain/symptoms eased by C. Rest;E. Changing positions;F. Certain positions;H. Cold;I. OTC medication(s)  (Aleve also helps)   Progression of symptoms since onset: Improved   Current / Previous Interventions   Diagnostic Tests: X-ray   X-ray Results unremarkable   Prior Level of Function   Prior Level of Function-Mobility indep   Prior Level of Function-ADLs indep   Functional Level Prior Comment  inedp   Current Level of Function   Current Community Support Family/friend caregiver   Patient role/employment history A. Employed   Employment Comments works over head   [facility]   Home/community accessibility no concerns   Current equipment-Gait/Locomotion None   Current equipment-ADL None   Fall Risk Screen   Fall screen completed by PT   Have you fallen 2 or more times in the past year? No   Have you fallen and had an injury in the past year? No   Is patient a fall risk? No   Abuse Screen (yes response referral indicated)   Feels Unsafe at Home or Work/School no   Feels Threatened by Someone no   Does Anyone Try to Keep You From Having Contact with Others or Doing Things Outside Your Home? no   Physical Signs of Abuse Present no   Shoulder Objective Findings   Side (if bilateral, select both right and left) Right   Observation NAD   Integumentary  no concerns   Posture Slightly rounded shoulders,   Cervical Screen (ROM, quadrant) neg   Pec Minor (supine) Flexibility tight   Neer's Test positive   Woo-Paul Test positive    Rumney's Test neg   Sulcus Test neg   Crossover Test positive   Shoulder Special Tests Comments Speeds positive!   Palpation TTP over injection site, lateral and anterior borders of acromion.  biceps tendon not specifically tender.  continue to monitor   Right Shoulder  Flexion AROM L 160 deg,  R 150 deg   Right Shoulder Abduction AROM L 180 degrees  R 155deg   Right Shoulder Flexion Strength Painful* on R, 5/5 L   Right Shoulder Abduction Strength less painful only minor onR , 4/5,  5/5L   Right Shoulder ER Strength pain on R 5/5 L   Right Shoulder IR Strength 5/5 (B)    Planned Therapy Interventions   Planned Therapy Interventions fine motor coordination training;manual therapy;joint mobilization;neuromuscular re-education;ROM;strengthening;stretching   Planned Modality Interventions   Planned Modality Interventions Cryotherapy   Planned Modality Interventions Comments PRN   Clinical Impression   Criteria for Skilled Therapeutic Interventions Met yes, treatment indicated   PT Diagnosis R shoulder Pain, decreased ROM and Decresed strength consistent with impingement syndrome   Influenced by the following impairments R shoulder pain, decreased ROM and decreased strength   Functional limitations due to impairments pain with ADLS and IADLS   Clinical Presentation Stable/Uncomplicated   Clinical Presentation Rationale pt is otherwise young and healthy. Clinical judgment   Clinical Decision Making (Complexity) Low complexity   Therapy Frequency 2 times/Week   Predicted Duration of Therapy Intervention (days/wks) 90 days   Risk & Benefits of therapy have been explained Yes   Patient, Family & other staff in agreement with plan of care Yes   Clinical Impression Comments PPW  R shoulder Pain, decreased ROM and Decresed strength consistent with impingement syndrome. Pt will benefit from skilled PT for instruction in scapular stabilization program snd strengthening. Pt will also benefit from skilled application of manual techniques and modalities to decrease pain and improve function.    Education Assessment   Preferred Learning Style Listening   Barriers to Learning No barriers   ORTHO GOALS   PT Ortho Eval Goals 1;2;3   Ortho Goal 1   Goal Identifier Work    Goal Description Pt able to  work overhead in his normal way for his full shift with no pain in his shoulder.   Target Date 06/27/21   Ortho Goal 2   Goal Identifier throwing    Goal Description Pt able to throw a baseball in his usual way with no pain during or after for a full practice as he normally would.   Target Date 06/27/21   Ortho Goal 3   Goal Identifier sleep    Goal Description Pt able to sleep through the night in his usual way with out being wakened by shoulder pain to resume restorative sleep.    Target Date 06/27/21   Total Evaluation Time   PT Sumit, Low Complexity Minutes (37415) 35

## 2021-04-05 ENCOUNTER — HOSPITAL ENCOUNTER (OUTPATIENT)
Dept: PHYSICAL THERAPY | Facility: CLINIC | Age: 46
Setting detail: THERAPIES SERIES
End: 2021-04-05
Attending: ORTHOPAEDIC SURGERY
Payer: COMMERCIAL

## 2021-04-05 PROCEDURE — 97140 MANUAL THERAPY 1/> REGIONS: CPT | Mod: GP | Performed by: PHYSICAL THERAPIST

## 2021-04-05 PROCEDURE — 97110 THERAPEUTIC EXERCISES: CPT | Mod: GP | Performed by: PHYSICAL THERAPIST

## 2021-04-12 ENCOUNTER — HOSPITAL ENCOUNTER (OUTPATIENT)
Dept: PHYSICAL THERAPY | Facility: CLINIC | Age: 46
Setting detail: THERAPIES SERIES
End: 2021-04-12
Attending: ORTHOPAEDIC SURGERY
Payer: COMMERCIAL

## 2021-04-12 PROCEDURE — 97110 THERAPEUTIC EXERCISES: CPT | Mod: GP | Performed by: PHYSICAL THERAPIST

## 2021-04-12 PROCEDURE — 97140 MANUAL THERAPY 1/> REGIONS: CPT | Mod: GP | Performed by: PHYSICAL THERAPIST

## 2021-04-26 ENCOUNTER — HOSPITAL ENCOUNTER (OUTPATIENT)
Dept: PHYSICAL THERAPY | Facility: CLINIC | Age: 46
Setting detail: THERAPIES SERIES
End: 2021-04-26
Attending: ORTHOPAEDIC SURGERY
Payer: COMMERCIAL

## 2021-04-26 PROCEDURE — 97110 THERAPEUTIC EXERCISES: CPT | Mod: GP | Performed by: PHYSICAL THERAPIST

## 2021-04-26 PROCEDURE — 97140 MANUAL THERAPY 1/> REGIONS: CPT | Mod: GP | Performed by: PHYSICAL THERAPIST

## 2021-04-28 ENCOUNTER — HOSPITAL ENCOUNTER (OUTPATIENT)
Dept: PHYSICAL THERAPY | Facility: CLINIC | Age: 46
Setting detail: THERAPIES SERIES
End: 2021-04-28
Attending: ORTHOPAEDIC SURGERY
Payer: COMMERCIAL

## 2021-04-28 PROCEDURE — 97110 THERAPEUTIC EXERCISES: CPT | Mod: GP | Performed by: PHYSICAL THERAPIST

## 2021-04-28 PROCEDURE — 97035 APP MDLTY 1+ULTRASOUND EA 15: CPT | Mod: GP | Performed by: PHYSICAL THERAPIST

## 2021-05-19 ENCOUNTER — HOSPITAL ENCOUNTER (OUTPATIENT)
Dept: PHYSICAL THERAPY | Facility: CLINIC | Age: 46
Setting detail: THERAPIES SERIES
End: 2021-05-19
Attending: ORTHOPAEDIC SURGERY
Payer: COMMERCIAL

## 2021-05-19 PROCEDURE — 97140 MANUAL THERAPY 1/> REGIONS: CPT | Mod: GP | Performed by: PHYSICAL THERAPIST

## 2021-05-19 PROCEDURE — 97110 THERAPEUTIC EXERCISES: CPT | Mod: GP | Performed by: PHYSICAL THERAPIST

## 2021-05-19 PROCEDURE — 97035 APP MDLTY 1+ULTRASOUND EA 15: CPT | Mod: GP | Performed by: PHYSICAL THERAPIST

## 2021-06-02 ENCOUNTER — HOSPITAL ENCOUNTER (OUTPATIENT)
Dept: PHYSICAL THERAPY | Facility: CLINIC | Age: 46
Setting detail: THERAPIES SERIES
End: 2021-06-02
Attending: ORTHOPAEDIC SURGERY
Payer: COMMERCIAL

## 2021-06-02 PROCEDURE — 97140 MANUAL THERAPY 1/> REGIONS: CPT | Mod: GP | Performed by: PHYSICAL THERAPIST

## 2021-06-02 PROCEDURE — 97110 THERAPEUTIC EXERCISES: CPT | Mod: GP | Performed by: PHYSICAL THERAPIST

## 2021-06-30 ENCOUNTER — OFFICE VISIT (OUTPATIENT)
Dept: ORTHOPEDICS | Facility: CLINIC | Age: 46
End: 2021-06-30
Payer: COMMERCIAL

## 2021-06-30 VITALS
BODY MASS INDEX: 39.92 KG/M2 | WEIGHT: 269.5 LBS | DIASTOLIC BLOOD PRESSURE: 86 MMHG | SYSTOLIC BLOOD PRESSURE: 128 MMHG | HEIGHT: 69 IN

## 2021-06-30 DIAGNOSIS — G89.29 CHRONIC RIGHT SHOULDER PAIN: Primary | ICD-10-CM

## 2021-06-30 DIAGNOSIS — M75.101 ROTATOR CUFF SYNDROME, RIGHT: ICD-10-CM

## 2021-06-30 DIAGNOSIS — M25.511 CHRONIC RIGHT SHOULDER PAIN: Primary | ICD-10-CM

## 2021-06-30 DIAGNOSIS — M75.21 TENDONITIS OF UPPER BICEPS TENDON OF RIGHT SHOULDER: ICD-10-CM

## 2021-06-30 DIAGNOSIS — M75.41 SUBACROMIAL IMPINGEMENT OF RIGHT SHOULDER: ICD-10-CM

## 2021-06-30 DIAGNOSIS — M25.511 PAIN IN RIGHT ACROMIOCLAVICULAR JOINT: ICD-10-CM

## 2021-06-30 PROCEDURE — 99213 OFFICE O/P EST LOW 20 MIN: CPT | Performed by: NURSE PRACTITIONER

## 2021-06-30 ASSESSMENT — PAIN SCALES - GENERAL: PAINLEVEL: MILD PAIN (2)

## 2021-06-30 ASSESSMENT — MIFFLIN-ST. JEOR: SCORE: 2102.82

## 2021-06-30 NOTE — PROGRESS NOTES
Office Visit-Follow up    Chief Complaint: Maverick Youngblood is a 45 year old male who is being seen for   Chief Complaint   Patient presents with     RECHECK     right shoulder subacromial impingement/bursitis tendinitis       History of Present Illness:   Today's visit:  Returns today. States the last injection he got helped the pain quite a bit, was able to sleep. He has also been attending physical therapy and doing home exercise program.  He states overall the pain at rest and his motion is better but pain with throwing motion, pain reaching behind his back and overhead work has not improved with injection and therapy.   No new injury. Same pain as previous. No issues with instability, subluxation.     March 25, 2021 visit:  Maverick Youngblood is a 45 year old male who is seen in consultation at the request of Juliet for evaluation of right shoulder pain.     Complains of shoulder pain for approximately 1 month.  No specific injuries or traumas.  Worse with overhead reaching.  He is also had some neck/trapezial area tenderness.  Nonradiating.  No peripheral numbness and tingling.  He is attempted some Aleve and some treatments with no success.  He is never had any shoulder issues in the past.  No surgeries or injections.  Rates pain is moderate to severe.  When he tried to throw a ball at 1 point cause severe pain.      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       REVIEW OF SYSTEMS  General: negative for, night sweats, dizziness, fatigue  Resp: No shortness of breath and no  "cough  CV: negative for chest pain, syncope or near-syncope  GI: negative for nausea, vomiting and diarrhea  : negative for dysuria and hematuria  Musculoskeletal: as above  Neurologic: negative for syncope   Hematologic: negative for bleeding disorder    Physical Exam:  Vitals: Ht 1.753 m (5' 9\")   Wt 122.2 kg (269 lb 8 oz)   BMI 39.80 kg/m    BMI= Body mass index is 39.8 kg/m .  Constitutional: healthy, alert and no acute distress   Psychiatric: mentation appears normal and affect normal/bright  NEURO: no focal deficits  RESP: Normal with easy respirations and no use of accessory muscles to breathe, no audible wheezing or retractions  CV: RUE:  no edema         Regular rate and rhythm by palpation  SKIN: No erythema, rashes, excoriation, or breakdown. No evidence of infection.   JOINT/EXTREMITIES:right shoulder: AROM 170/140/50/back pocket. Pain with IR. PROM 180/160 - did not tolerate past 160 due to pain. Mild pain with cross body. No weakness or pain with infraspinatus or supraspinatus. Pain and weakness with lift-off/supraspintus testing.  Tender along proximal bicipital grove and AC joint. +Woo.  Negative Ortiz's. Negative anterior apprehension.   GAIT: not tested             Diagnostic Modalities:  None today.  Independent visualization of the images was performed.      Impression: right shoulder pain with weakness and pain with subscapularis testing.  Proximal bicep tendonitis  Painful AC joint.  Subacromial impingement.     Plan:  All of the above pertinent physical exam and imaging modalities findings was reviewed with Maverick.  The injection helped with the pain along with physical therapy. However still struggling with reaching behind his back, with throwing and with some overhead work. Those symptoms have not improved. He does have subscapularis/bicep provocative findings.  Recommended MRI to rule out rotator cuff/bicep pathology.    If this is negative or shows a partial tear then would " consider AC injection as the next step.       Return to clinic to discuss test results, or sooner as needed for changes.  Re-x-ray on return: No    TERESE Crook, CNP  Orthopedic Surgery

## 2021-06-30 NOTE — LETTER
6/30/2021         RE: Maverick Youngblood  [address] [facility] [address]        Dear Colleague,    Thank you for referring your patient, Maverick Youngblood, to the [facility]. Please see a copy of my visit note below.    Office Visit-Follow up    Chief Complaint: Maverick Youngblood is a 45 year old male who is being seen for   Chief Complaint   Patient presents with     RECHECK     right shoulder subacromial impingement/bursitis tendinitis       History of Present Illness:   Today's visit:  Returns today. States the last injection he got helped the pain quite a bit, was able to sleep. He has also been attending physical therapy and doing home exercise program.  He states overall the pain at rest and his motion is better but pain with throwing motion, pain reaching behind his back and overhead work has not improved with injection and therapy.   No new injury. Same pain as previous. No issues with instability, subluxation.     March 25, 2021 visit:  Maverick Youngblood is a 45 year old male who is seen in consultation at the request of Juliet for evaluation of right shoulder pain.     Complains of shoulder pain for approximately 1 month.  No specific injuries or traumas.  Worse with overhead reaching.  He is also had some neck/trapezial area tenderness.  Nonradiating.  No peripheral numbness and tingling.  He is attempted some Aleve and some treatments with no success.  He is never had any shoulder issues in the past.  No surgeries or injections.  Rates pain is moderate to severe.  When he tried to throw a ball at 1 point cause severe pain.      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       REVIEW OF SYSTEMS  General: negative for, night sweats, dizziness, fatigue  Resp: No shortness of breath and no cough  CV: negative for chest pain, syncope or near-syncope  GI: negative for nausea, vomiting and diarrhea  : negative for dysuria and hematuria  Musculoskeletal: as above  Neurologic: negative for syncope   Hematologic: negative for bleeding disorder    Physical Exam:  Vitals: Ht 1.753 m (5' 9\")   Wt 122.2 kg (269 lb 8 oz)   BMI 39.80 kg/m    BMI= Body mass index is 39.8 kg/m .  Constitutional: healthy, alert and no acute distress   Psychiatric: mentation appears normal and affect normal/bright  NEURO: no focal deficits  RESP: Normal with easy respirations and no use of accessory muscles to breathe, no audible wheezing or retractions  CV: RUE:  no edema         Regular rate and rhythm by palpation  SKIN: No erythema, rashes, excoriation, or breakdown. No evidence of infection.   JOINT/EXTREMITIES:right shoulder: AROM 170/140/50/back pocket. Pain with IR. PROM 180/160 - did not tolerate past 160 due to pain. Mild pain with cross body. No weakness or pain with infraspinatus or supraspinatus. Pain and weakness with lift-off/supraspintus testing.  Tender along proximal bicipital grove and AC joint. +Woo.  Negative Ortiz's. Negative anterior apprehension.   GAIT: not tested             Diagnostic Modalities:  None today.  Independent visualization of the images was performed.      Impression: right shoulder pain with weakness and pain with subscapularis testing.  Proximal bicep tendonitis  Painful AC joint.  Subacromial impingement.     Plan:  All of the above pertinent physical exam and imaging modalities findings was reviewed with Maverick.  The injection helped with the pain along with physical therapy. However still struggling with reaching behind his back, with " throwing and with some overhead work. Those symptoms have not improved. He does have subscapularis/bicep provocative findings.  Recommended MRI to rule out rotator cuff/bicep pathology.    If this is negative or shows a partial tear then would consider AC injection as the next step.       Return to clinic to discuss test results, or sooner as needed for changes.  Re-x-ray on return: No    TERESE Crook, CNP  Orthopedic Surgery          Again, thank you for allowing me to participate in the care of your patient.        Sincerely,        TERESE Burgess CNP

## 2021-07-07 ENCOUNTER — HOSPITAL ENCOUNTER (OUTPATIENT)
Dept: MRI IMAGING | Facility: CLINIC | Age: 46
Discharge: HOME | End: 2021-07-07
Attending: NURSE PRACTITIONER | Admitting: NURSE PRACTITIONER
Payer: COMMERCIAL

## 2021-07-07 DIAGNOSIS — M25.511 PAIN IN RIGHT ACROMIOCLAVICULAR JOINT: ICD-10-CM

## 2021-07-07 DIAGNOSIS — M25.511 CHRONIC RIGHT SHOULDER PAIN: ICD-10-CM

## 2021-07-07 DIAGNOSIS — M75.21 TENDONITIS OF UPPER BICEPS TENDON OF RIGHT SHOULDER: ICD-10-CM

## 2021-07-07 DIAGNOSIS — M75.41 SUBACROMIAL IMPINGEMENT OF RIGHT SHOULDER: ICD-10-CM

## 2021-07-07 DIAGNOSIS — M75.101 ROTATOR CUFF SYNDROME, RIGHT: ICD-10-CM

## 2021-07-07 DIAGNOSIS — G89.29 CHRONIC RIGHT SHOULDER PAIN: ICD-10-CM

## 2021-07-07 PROCEDURE — 73221 MRI JOINT UPR EXTREM W/O DYE: CPT | Mod: 26 | Performed by: RADIOLOGY

## 2021-07-07 PROCEDURE — 73221 MRI JOINT UPR EXTREM W/O DYE: CPT | Mod: RT

## 2021-07-14 ENCOUNTER — OFFICE VISIT (OUTPATIENT)
Dept: ORTHOPEDICS | Facility: CLINIC | Age: 46
End: 2021-07-14
Payer: COMMERCIAL

## 2021-07-14 VITALS
DIASTOLIC BLOOD PRESSURE: 90 MMHG | WEIGHT: 269 LBS | HEIGHT: 69 IN | SYSTOLIC BLOOD PRESSURE: 120 MMHG | BODY MASS INDEX: 39.84 KG/M2

## 2021-07-14 DIAGNOSIS — G89.29 CHRONIC RIGHT SHOULDER PAIN: Primary | ICD-10-CM

## 2021-07-14 DIAGNOSIS — M25.511 CHRONIC RIGHT SHOULDER PAIN: Primary | ICD-10-CM

## 2021-07-14 DIAGNOSIS — M25.511 PAIN IN RIGHT ACROMIOCLAVICULAR JOINT: ICD-10-CM

## 2021-07-14 PROCEDURE — 99213 OFFICE O/P EST LOW 20 MIN: CPT | Mod: 25 | Performed by: ORTHOPAEDIC SURGERY

## 2021-07-14 PROCEDURE — 20605 DRAIN/INJ JOINT/BURSA W/O US: CPT | Mod: RT | Performed by: ORTHOPAEDIC SURGERY

## 2021-07-14 RX ORDER — TRIAMCINOLONE ACETONIDE 40 MG/ML
40 INJECTION, SUSPENSION INTRA-ARTICULAR; INTRAMUSCULAR ONCE
Status: COMPLETED | OUTPATIENT
Start: 2021-07-14 | End: 2021-07-14

## 2021-07-14 RX ORDER — BUPIVACAINE HYDROCHLORIDE 5 MG/ML
3 INJECTION, SOLUTION PERINEURAL ONCE
Status: COMPLETED | OUTPATIENT
Start: 2021-07-14 | End: 2021-07-14

## 2021-07-14 RX ADMIN — BUPIVACAINE HYDROCHLORIDE 15 MG: 5 INJECTION, SOLUTION PERINEURAL at 15:55

## 2021-07-14 RX ADMIN — TRIAMCINOLONE ACETONIDE 40 MG: 40 INJECTION, SUSPENSION INTRA-ARTICULAR; INTRAMUSCULAR at 15:55

## 2021-07-14 ASSESSMENT — MIFFLIN-ST. JEOR: SCORE: 2100.56

## 2021-07-14 ASSESSMENT — PAIN SCALES - GENERAL: PAINLEVEL: MILD PAIN (2)

## 2021-07-14 NOTE — PROGRESS NOTES
Office Visit-Follow up    Chief Complaint: Maverick Youngblood is a 45 year old male who is being seen for   Chief Complaint   Patient presents with     RECHECK     mri results right shoulder       History of Present Illness:   Today's visit:  Returns today for the right shoulder MRI results. No changes over the last 2 weeks.     June 30, 2021 visit:  Returns today. States the last injection he got helped the pain quite a bit, was able to sleep. He has also been attending physical therapy and doing home exercise program.  He states overall the pain at rest and his motion is better but pain with throwing motion, pain reaching behind his back and overhead work has not improved with injection and therapy.   No new injury. Same pain as previous. No issues with instability, subluxation.    March 25, 2021 visit:  Maverick Youngblood is a 45 year old male who is seen in consultation at the request of Juliet for evaluation of right shoulder pain.     Complains of shoulder pain for approximately 1 month.  No specific injuries or traumas.  Worse with overhead reaching.  He is also had some neck/trapezial area tenderness.  Nonradiating.  No peripheral numbness and tingling.  He is attempted some Aleve and some treatments with no success.  He is never had any shoulder issues in the past.  No surgeries or injections.  Rates pain is moderate to severe.  When he tried to throw a ball at 1 point cause severe pain.       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       REVIEW OF SYSTEMS  General: negative for,  "night sweats, dizziness, fatigue  Resp: No shortness of breath and no cough  CV: negative for chest pain, syncope or near-syncope  GI: negative for nausea, vomiting and diarrhea  : negative for dysuria and hematuria  Musculoskeletal: as above  Neurologic: negative for syncope   Hematologic: negative for bleeding disorder    Physical Exam:  Vitals: BP (!) 120/90   Ht 1.753 m (5' 9\")   Wt 122 kg (269 lb)   BMI 39.72 kg/m    BMI= Body mass index is 39.72 kg/m .  Constitutional: healthy, alert and no acute distress   Psychiatric: mentation appears normal and affect normal/bright  NEURO: no focal deficits  RESP: Normal with easy respirations and no use of accessory muscles to breathe, no audible wheezing or retractions  CV: RUE:  no edema         Regular rate and rhythm by palpation  SKIN: No erythema, rashes, excoriation, or breakdown. No evidence of infection.   JOINT/EXTREMITIES:right shoulder: AROM 180/170/50/back pocket. No pain with IR.   pain with cross body. No weakness or pain with infraspinatus or supraspinatus.   Tender along  AC joint. +Woo.  Negative Ortiz's.  GAIT: not tested       Diagnostic Modalities:  right shoulder MRI:    1. Query superior labral tearing.  2. On a background of tendinosis, very low-grade articular sided tear  of the supraspinatus middle and posterior fibers adjacent to the  footprint and separately very low-grade bursal sided tear of the  supraspinatus/infraspinatus junctional fibers near the footprint. No  high grade tear.  3. Query very mild subacromial/subdeltoid bursitis.  4. Long head of biceps intra-articular segment tendinosis.  5. Small joint effusion particularly subscapular recess with  approximately 4 x 13 mm hypointense body.      Independent visualization of the images was performed.      Impression:right shoulder rotator cuff tendonitis with low grade partial tear  Right shoulder bicep tendonitis  Right shoulder painful AC joint      Plan:  All of the above " pertinent physical exam and imaging modalities findings was reviewed with Maverick.  Discussed with patient. No high grade tearing to the rotator cuff.  His complaints are mostly with throwing, reaching across body and reaching up. No shoulder instability. Most of the pain seems associated with AC joint at this point. Recommend attempting AC joint injection for both therapeutic and diagnostic response. Recommended patient keep log of pain relief and how long lasting. He just finished formal physical therapy and continues at home exercises.  Will base next steps if needed on how the injection helps.     The patient was counseled about an  injection, including discussion of risks (including infection), contents of the injection, rationale for performing the injection, and expected benefits of the injection. The skin was prepped with alcohol and betadine and then utilizing sterile technique an injection of the right shoulder AC joint was performed. The injection consisted 1ml of Kenalog (40mg per 1 ml) mixed with 3ml of 0.5% Marcaine. The patient tolerated the injection well, and there were no complications. The injection site was covered with a Band-Aid. The injection was performed by TERESE Armendariz, CNP, DNP      If any concerns for infection seek immediate medical evaluation either in the clinic or the ED.     Approximately 5 minutes after the injection he did have less pain with crossbody, forward flexion.        Return to clinic 4-6 weeks if not improving , PRN, or sooner as needed for changes.  Re-x-ray on return: No    Scribed by:  TERESE Armendariz CNP  5:09 PM  7/14/2021  The information in this document, created by a scribe for me, accurately reflects the services I personally performed and the decisions made by me. I have reviewed and approved this document for accuracy    Sharad Shaw DO

## 2021-07-14 NOTE — LETTER
7/14/2021         RE: Maverick Youngblood  [address] [facility] [address]        Dear Colleague,    Thank you for referring your patient, Maverick Youngblood, to the [facility]. Please see a copy of my visit note below.    Bill to follow up with Primary Care provider regarding elevated blood pressure.  Juan/ABAD     Office Visit-Follow up    Chief Complaint: Maverick Youngblood is a 45 year old male who is being seen for   Chief Complaint   Patient presents with     RECHECK     mri results right shoulder       History of Present Illness:   Today's visit:  Returns today for the right shoulder MRI results. No changes over the last 2 weeks.     June 30, 2021 visit:  Returns today. States the last injection he got helped the pain quite a bit, was able to sleep. He has also been attending physical therapy and doing home exercise program.  He states overall the pain at rest and his motion is better but pain with throwing motion, pain reaching behind his back and overhead work has not improved with injection and therapy.   No new injury. Same pain as previous. No issues with instability, subluxation.    March 25, 2021 visit:  Maverick Youngblood is a 45 year old male who is seen in consultation at the request of Juliet for evaluation of right shoulder pain.     Complains of shoulder pain for approximately 1 month.  No specific injuries or traumas.  Worse with overhead reaching.  He is also had some neck/trapezial area tenderness.  Nonradiating.  No peripheral numbness and tingling.  He is attempted some Aleve and some treatments with no success.  He is never had any shoulder issues in the past.  No surgeries or injections.  Rates pain is moderate to severe.  When he tried to throw a ball at 1 point cause severe pain.       Social History     Occupational History     Occupation: [employer]     Employer: [employer]   Tobacco Use     Smoking status: Former Smoker     Packs/day: 1.00      "Years: 10.00     Pack years: 10.00     Quit date: 8/30/2005     Years since quitting: 15.8     Smokeless tobacco: Former User   Substance and Sexual Activity     Alcohol use: Yes     Comment: 2 drinks/week     Drug use: No     Sexual activity: Yes     Partners: Female     Birth control/protection: I.U.D.       REVIEW OF SYSTEMS  General: negative for, night sweats, dizziness, fatigue  Resp: No shortness of breath and no cough  CV: negative for chest pain, syncope or near-syncope  GI: negative for nausea, vomiting and diarrhea  : negative for dysuria and hematuria  Musculoskeletal: as above  Neurologic: negative for syncope   Hematologic: negative for bleeding disorder    Physical Exam:  Vitals: BP (!) 120/90   Ht 1.753 m (5' 9\")   Wt 122 kg (269 lb)   BMI 39.72 kg/m    BMI= Body mass index is 39.72 kg/m .  Constitutional: healthy, alert and no acute distress   Psychiatric: mentation appears normal and affect normal/bright  NEURO: no focal deficits  RESP: Normal with easy respirations and no use of accessory muscles to breathe, no audible wheezing or retractions  CV: RUE:  no edema         Regular rate and rhythm by palpation  SKIN: No erythema, rashes, excoriation, or breakdown. No evidence of infection.   JOINT/EXTREMITIES:right shoulder: AROM 180/170/50/back pocket. No pain with IR.   pain with cross body. No weakness or pain with infraspinatus or supraspinatus.   Tender along  AC joint. +Woo.  Negative Ortiz's.  GAIT: not tested       Diagnostic Modalities:  right shoulder MRI:    1. Query superior labral tearing.  2. On a background of tendinosis, very low-grade articular sided tear  of the supraspinatus middle and posterior fibers adjacent to the  footprint and separately very low-grade bursal sided tear of the  supraspinatus/infraspinatus junctional fibers near the footprint. No  high grade tear.  3. Query very mild subacromial/subdeltoid bursitis.  4. Long head of biceps intra-articular segment " tendinosis.  5. Small joint effusion particularly subscapular recess with  approximately 4 x 13 mm hypointense body.      Independent visualization of the images was performed.      Impression:right shoulder rotator cuff tendonitis with low grade partial tear  Right shoulder bicep tendonitis  Right shoulder painful AC joint      Plan:  All of the above pertinent physical exam and imaging modalities findings was reviewed with Maverick.  Discussed with patient. No high grade tearing to the rotator cuff.  His complaints are mostly with throwing, reaching across body and reaching up. No shoulder instability. Most of the pain seems associated with AC joint at this point. Recommend attempting AC joint injection for both therapeutic and diagnostic response. Recommended patient keep log of pain relief and how long lasting. He just finished formal physical therapy and continues at home exercises.  Will base next steps if needed on how the injection helps.     The patient was counseled about an  injection, including discussion of risks (including infection), contents of the injection, rationale for performing the injection, and expected benefits of the injection. The skin was prepped with alcohol and betadine and then utilizing sterile technique an injection of the right shoulder AC joint was performed. The injection consisted 1ml of Kenalog (40mg per 1 ml) mixed with 3ml of 0.5% Marcaine. The patient tolerated the injection well, and there were no complications. The injection site was covered with a Band-Aid. The injection was performed by TERESE Armendariz, CNP, DNP      If any concerns for infection seek immediate medical evaluation either in the clinic or the ED.     Approximately 5 minutes after the injection he did have less pain with crossbody, forward flexion.        Return to clinic 4-6 weeks if not improving , PRN, or sooner as needed for changes.  Re-x-ray on return: No    Scribed by:  ETRESE Armendariz, RADHA  5:09  PM  7/14/2021  The information in this document, created by a scribe for me, accurately reflects the services I personally performed and the decisions made by me. I have reviewed and approved this document for accuracy    Sharad Shaw, DO             Again, thank you for allowing me to participate in the care of your patient.        Sincerely,        Sharad Shaw DO

## 2021-10-04 NOTE — PROGRESS NOTES
Outpatient Physical Therapy Discharge Note     Patient: Maverick Youngblood  : 1975    Beginning/End Dates of Reporting Period:  3/29/21 to 21    Referring Provider: Sharad Shaw DO    Therapy Diagnosis: R shoulder Pain, decreased ROM and Decresed strength consistent with impingement syndrome     Client Self Report: Pt notes that he is making progress but noticing that he is having the most difficulty with reaching arm back and throwing.          Goals:  Goal Identifier Work    Goal Description Pt able to work overhead in his normal way for his full shift with no pain in his shoulder.   Target Date 21   Date Met      Progress (detail required for progress note):  Improved      Goal Identifier throwing    Goal Description Pt able to throw a baseball in his usual way with no pain during or after for a full practice as he normally would.   Target Date 21   Date Met      Progress (detail required for progress note):  Still difficult but improved from start of care     Goal Identifier sleep    Goal Description Pt able to sleep through the night in his usual way with out being wakened by shoulder pain to resume restorative sleep.    Target Date 21   Date Met  21   Progress (detail required for progress note):             Plan:  Discharge from therapy.    Discharge:    Reason for Discharge: Patient has met all goals.    Equipment Issued: none    Discharge Plan: Patient to continue home program.

## 2021-10-23 ENCOUNTER — HEALTH MAINTENANCE LETTER (OUTPATIENT)
Age: 46
End: 2021-10-23

## 2022-02-12 ENCOUNTER — HEALTH MAINTENANCE LETTER (OUTPATIENT)
Age: 47
End: 2022-02-12

## 2022-05-26 DIAGNOSIS — E78.5 HYPERLIPIDEMIA LDL GOAL <130: ICD-10-CM

## 2022-05-27 RX ORDER — ROSUVASTATIN CALCIUM 40 MG/1
40 TABLET, COATED ORAL DAILY
Qty: 90 TABLET | Refills: 0 | Status: SHIPPED | OUTPATIENT
Start: 2022-05-27 | End: 2023-01-24

## 2022-05-27 NOTE — TELEPHONE ENCOUNTER
Pending Prescriptions:                       Disp   Refills    rosuvastatin (CRESTOR) 40 MG tablet [Pharm*90 tab*4        Sig: Take 1 tablet (40 mg) by mouth daily      Routing refill request to provider for review/approval because:  Labs not current:    Recent Labs   Lab Test 12/19/20  0919 08/13/19  1547 11/28/15  0805 10/18/14  0759 05/03/14  0754   CHOL 181 214*   < > 244* 220*   HDL 51 50   < > 46 39*   * 142*   < > 174* 162*   TRIG 87 109   < > 121 93   CHOLHDLRATIO  --   --   --  5.3* 6.0*    < > = values in this interval not displayed.       Patient needs to be seen because it has been more than 1 year since last office visit.    Candice Matute RN

## 2022-05-27 NOTE — TELEPHONE ENCOUNTER
Medication refilled x1.  Needs appointment for further refills.  Will have staff notify patient.    Quirino Meyers MD

## 2022-10-09 ENCOUNTER — HEALTH MAINTENANCE LETTER (OUTPATIENT)
Age: 47
End: 2022-10-09

## 2023-01-20 DIAGNOSIS — E78.5 HYPERLIPIDEMIA LDL GOAL <130: ICD-10-CM

## 2023-01-24 RX ORDER — ROSUVASTATIN CALCIUM 40 MG/1
40 TABLET, COATED ORAL DAILY
Qty: 90 TABLET | Refills: 0 | Status: SHIPPED | OUTPATIENT
Start: 2023-01-24 | End: 2023-02-24

## 2023-01-24 NOTE — TELEPHONE ENCOUNTER
"Routing refill request to provider for review/approval because:  Break in medication +  Future Appointments 1/24/2023 - 7/23/2023        Date Visit Type Length Department Provider     2/24/2023  9:40 AM OFFICE VISIT 20 min  FAMILY PRACTICE Quirino Meyers MD    Location Instructions:     [facility] is located at [address], within [facility]. This is near the [address] exit off of [address]. Turn north off the exit, then west onto [address]. Park in the Blue Lot. The [facility] and [facility] center share a main entrance and .                      Requested Prescriptions   Pending Prescriptions Disp Refills    rosuvastatin (CRESTOR) 40 MG tablet [Pharmacy Med Name: ROSUVASTATIN CALCIUM 40MG TABS] 90 tablet 0     Sig: TAKE 1 TABLET (40 MG) BY MOUTH DAILY       Statins Protocol Failed - 1/20/2023  7:05 PM        Failed - LDL on file in past 12 months     Recent Labs   Lab Test 12/19/20  0919   *             Failed - Recent (12 mo) or future (30 days) visit within the authorizing provider's specialty     Patient has had an office visit with the authorizing provider or a provider within the authorizing providers department within the previous 12 mos or has a future within next 30 days. See \"Patient Info\" tab in inbasket, or \"Choose Columns\" in Meds & Orders section of the refill encounter.              Passed - No abnormal creatine kinase in past 12 months     Recent Labs   Lab Test 02/16/16  1103   *                Passed - Medication is active on med list        Passed - Patient is age 18 or older                   "

## 2023-02-18 ENCOUNTER — HEALTH MAINTENANCE LETTER (OUTPATIENT)
Age: 48
End: 2023-02-18

## 2023-02-24 ENCOUNTER — OFFICE VISIT (OUTPATIENT)
Dept: FAMILY MEDICINE | Facility: CLINIC | Age: 48
End: 2023-02-24
Payer: COMMERCIAL

## 2023-02-24 VITALS
SYSTOLIC BLOOD PRESSURE: 110 MMHG | BODY MASS INDEX: 40.01 KG/M2 | OXYGEN SATURATION: 98 % | WEIGHT: 270.13 LBS | HEART RATE: 70 BPM | HEIGHT: 69 IN | DIASTOLIC BLOOD PRESSURE: 76 MMHG | TEMPERATURE: 98.2 F

## 2023-02-24 DIAGNOSIS — E66.01 SEVERE OBESITY (BMI 35.0-39.9) WITH COMORBIDITY (H): ICD-10-CM

## 2023-02-24 DIAGNOSIS — E78.5 HYPERLIPIDEMIA LDL GOAL <130: Primary | ICD-10-CM

## 2023-02-24 DIAGNOSIS — Z12.11 COLON CANCER SCREENING: ICD-10-CM

## 2023-02-24 DIAGNOSIS — Z11.59 NEED FOR HEPATITIS C SCREENING TEST: ICD-10-CM

## 2023-02-24 LAB
CHOLEST SERPL-MCNC: 177 MG/DL
HCV AB SERPL QL IA: NONREACTIVE
HDLC SERPL-MCNC: 48 MG/DL
LDLC SERPL CALC-MCNC: 102 MG/DL
NONHDLC SERPL-MCNC: 129 MG/DL
TRIGL SERPL-MCNC: 134 MG/DL

## 2023-02-24 PROCEDURE — 90715 TDAP VACCINE 7 YRS/> IM: CPT | Performed by: FAMILY MEDICINE

## 2023-02-24 PROCEDURE — 99213 OFFICE O/P EST LOW 20 MIN: CPT | Mod: 25 | Performed by: FAMILY MEDICINE

## 2023-02-24 PROCEDURE — 90472 IMMUNIZATION ADMIN EACH ADD: CPT | Performed by: FAMILY MEDICINE

## 2023-02-24 PROCEDURE — 36415 COLL VENOUS BLD VENIPUNCTURE: CPT | Performed by: FAMILY MEDICINE

## 2023-02-24 PROCEDURE — 90636 HEP A/HEP B VACC ADULT IM: CPT | Performed by: FAMILY MEDICINE

## 2023-02-24 PROCEDURE — 90471 IMMUNIZATION ADMIN: CPT | Performed by: FAMILY MEDICINE

## 2023-02-24 PROCEDURE — 86803 HEPATITIS C AB TEST: CPT | Performed by: FAMILY MEDICINE

## 2023-02-24 PROCEDURE — 80061 LIPID PANEL: CPT | Performed by: FAMILY MEDICINE

## 2023-02-24 RX ORDER — ROSUVASTATIN CALCIUM 40 MG/1
40 TABLET, COATED ORAL DAILY
Qty: 90 TABLET | Refills: 4 | Status: SHIPPED | OUTPATIENT
Start: 2023-02-24 | End: 2024-05-07

## 2023-02-24 NOTE — NURSING NOTE
Prior to immunization administration, verified patients identity using patient s name and date of birth. Please see Immunization Activity for additional information.     Screening Questionnaire for Adult Immunization    Are you sick today?   No   Do you have allergies to medications, food, a vaccine component or latex?   No   Have you ever had a serious reaction after receiving a vaccination?   No   Do you have a long-term health problem with heart, lung, kidney, or metabolic disease (e.g., diabetes), asthma, a blood disorder, no spleen, complement component deficiency, a cochlear implant, or a spinal fluid leak?  Are you on long-term aspirin therapy?   No   Do you have cancer, leukemia, HIV/AIDS, or any other immune system problem?   No   Do you have a parent, brother, or sister with an immune system problem?   No   In the past 3 months, have you taken medications that affect  your immune system, such as prednisone, other steroids, or anticancer drugs; drugs for the treatment of rheumatoid arthritis, Crohn s disease, or psoriasis; or have you had radiation treatments?   No   Have you had a seizure, or a brain or other nervous system problem?   No   During the past year, have you received a transfusion of blood or blood    products, or been given immune (gamma) globulin or antiviral drug?   No   For women: Are you pregnant or is there a chance you could become       pregnant during the next month?   No   Have you received any vaccinations in the past 4 weeks?   No     Immunization questionnaire answers were all negative.        Patient instructed to remain in clinic for 15 minutes afterwards, and to report any adverse reaction to me immediately.       Screening performed by Lora Streeter on 2/24/2023 at 10:31 AM.

## 2023-02-24 NOTE — PROGRESS NOTES
"  Assessment & Plan     ASSESSMENT/ORDERS:    ICD-10-CM    1. Hyperlipidemia LDL goal <130  E78.5 Lipid panel reflex to direct LDL Non-fasting     rosuvastatin (CRESTOR) 40 MG tablet     Lipid panel reflex to direct LDL Non-fasting      2. Severe obesity (BMI 35.0-39.9) with comorbidity (H)  E66.01       3. Need for hepatitis C screening test  Z11.59 Hepatitis C Screen Reflex to HCV RNA Quant and Genotype     Hepatitis C Screen Reflex to HCV RNA Quant and Genotype      4. Colon cancer screening  Z12.11 Fecal colorectal cancer screen (FIT)     Fecal colorectal cancer screen (FIT)        PLAN:  1.   Medications refilled for all other above noted stable conditions.  Labs ordered as noted above.               BMI:   Estimated body mass index is 39.89 kg/m  as calculated from the following:    Height as of this encounter: 1.753 m (5' 9\").    Weight as of this encounter: 122.5 kg (270 lb 2 oz).   Weight management plan: diet/exercise recommended for lipid improvement in addition to medication        Return in about 1 year (around 2/24/2024) for next preventative visit (Physical).    Quirino Meyers MD  [facility] ANASTASIA Che is a 47 year old accompanied by his , presenting for the following health issues:  Recheck Medication      History of Present Illness       Reason for visit:  Yearly checkup    He eats 0-1 servings of fruits and vegetables daily.He consumes 2 sweetened beverage(s) daily.He exercises with enough effort to increase his heart rate 10 to 19 minutes per day.  He exercises with enough effort to increase his heart rate 4 days per week. He is missing 1 dose(s) of medications per week.  He is not taking prescribed medications regularly due to remembering to take.       Hyperlipidemia Follow-Up      Are you regularly taking any medication or supplement to lower your cholesterol?   Yes- rovastatin    Are you having muscle aches or other side effects that you think could be " "caused by your cholesterol lowering medication?  No      No issues with statin.  Tolerating it fine.  Due for labs.  Lab Results   Component Value Date    CHOL 177 02/24/2023    CHOL 181 12/19/2020     Lab Results   Component Value Date    HDL 48 02/24/2023    HDL 51 12/19/2020     Lab Results   Component Value Date     02/24/2023     12/19/2020     Lab Results   Component Value Date    TRIG 134 02/24/2023    TRIG 87 12/19/2020     Lab Results   Component Value Date    CHOLHDLRATIO 5.3 10/18/2014          Review of Systems         Objective    /76   Pulse 70   Temp 98.2  F (36.8  C) (Temporal)   Ht 1.753 m (5' 9\")   Wt 122.5 kg (270 lb 2 oz)   SpO2 98%   BMI 39.89 kg/m    Body mass index is 39.89 kg/m .  Physical Exam  Constitutional:       Appearance: He is well-developed.   Cardiovascular:      Rate and Rhythm: Normal rate and regular rhythm.      Heart sounds: Normal heart sounds, S1 normal and S2 normal. No murmur heard.  Pulmonary:      Effort: Pulmonary effort is normal. No respiratory distress.      Breath sounds: Normal breath sounds. No wheezing, rhonchi or rales.   Neurological:      Mental Status: He is alert.                            "

## 2023-03-10 NOTE — RESULT ENCOUNTER NOTE
Bill,  Your cholesterol results are all clinically at goal.  Hep C negative.  Please let me know if you have any questions.    Sincerely,  Dr. Meyers

## 2023-07-26 ENCOUNTER — HOSPITAL ENCOUNTER (EMERGENCY)
Facility: CLINIC | Age: 48
Discharge: HOME | End: 2023-07-26
Attending: EMERGENCY MEDICINE | Admitting: EMERGENCY MEDICINE
Payer: COMMERCIAL

## 2023-07-26 VITALS
DIASTOLIC BLOOD PRESSURE: 96 MMHG | SYSTOLIC BLOOD PRESSURE: 131 MMHG | OXYGEN SATURATION: 96 % | TEMPERATURE: 97.6 F | RESPIRATION RATE: 18 BRPM | HEART RATE: 84 BPM | WEIGHT: 270.2 LBS | HEIGHT: 71 IN | BODY MASS INDEX: 37.83 KG/M2

## 2023-07-26 DIAGNOSIS — S01.01XA SCALP LACERATION, INITIAL ENCOUNTER: ICD-10-CM

## 2023-07-26 PROCEDURE — 12001 RPR S/N/AX/GEN/TRNK 2.5CM/<: CPT | Performed by: EMERGENCY MEDICINE

## 2023-07-26 PROCEDURE — 99283 EMERGENCY DEPT VISIT LOW MDM: CPT | Performed by: EMERGENCY MEDICINE

## 2023-07-26 PROCEDURE — 99283 EMERGENCY DEPT VISIT LOW MDM: CPT | Mod: 25 | Performed by: EMERGENCY MEDICINE

## 2023-07-26 ASSESSMENT — ACTIVITIES OF DAILY LIVING (ADL): ADLS_ACUITY_SCORE: 33

## 2023-07-26 NOTE — ED PROVIDER NOTES
History     Chief Complaint   Patient presents with    Head Laceration     HPI  Maverick Youngblood is a 47 year old male who presents with a scalp laceration.  He was on the underside of a deck when he stood up hit his head on the deck.  Small laceration.  Some bleeding.  Tetanus up-to-date.  No loss of consciousness.  No headache    Allergies:  No Known Allergies    Problem List:    Patient Active Problem List    Diagnosis Date Noted    Chronic right shoulder pain 2021     Priority: Medium    Pain in right acromioclavicular joint 2021     Priority: Medium    Rotator cuff syndrome, right 2021     Priority: Medium    Tendonitis of upper biceps tendon of right shoulder 2021     Priority: Medium    Subacromial impingement of right shoulder 2021     Priority: Medium    Severe obesity (BMI 35.0-39.9) with comorbidity (H) 2016     Priority: Medium    Hyperlipidemia LDL goal <130 2011     Priority: Medium    Family history of ischemic heart disease 2011     Priority: Medium        Past Medical History:    No past medical history on file.    Past Surgical History:    No past surgical history on file.    Family History:    Family History   Problem Relation Age of Onset    Lipids Mother     Lipids Maternal Grandmother     Unknown/Adopted Maternal Grandfather     Lipids Maternal Uncle     C.A.D. Maternal Uncle 36    Colon Cancer No family hx of        Social History:  Marital Status:   [2]  Social History     Tobacco Use    Smoking status: Former     Packs/day: 1.00     Years: 10.00     Pack years: 10.00     Types: Cigarettes     Quit date: 2005     Years since quittin.9    Smokeless tobacco: Former   Vaping Use    Vaping Use: Never used   Substance Use Topics    Alcohol use: Yes     Comment: 2 drinks/week    Drug use: No        Medications:    rosuvastatin (CRESTOR) 40 MG tablet          Review of Systems  All other systems are reviewed and are  "negative    Physical Exam   BP: (!) 131/96  Pulse: 84  Temp: 97.6  F (36.4  C)  Resp: 18  Height: 180.3 cm (5' 11\")  Weight: 122.6 kg (270 lb 3.2 oz)  SpO2: 96 %      Physical Exam  Vitals reviewed.   Constitutional:       General: He is not in acute distress.     Appearance: He is not diaphoretic.   HENT:      Head:      Comments: 2 cm scalp laceration on crown of the head just right of midline.  No bony step-off or crepitus.  Bleeding controlled.  No foreign bodies  Eyes:      General: No scleral icterus.        Right eye: No discharge.         Left eye: No discharge.      Conjunctiva/sclera: Conjunctivae normal.   Pulmonary:      Effort: Pulmonary effort is normal.      Breath sounds: No stridor.   Musculoskeletal:         General: Normal range of motion.      Cervical back: Normal range of motion.   Skin:     General: Skin is warm and dry.      Findings: No rash.   Neurological:      Mental Status: He is alert.      Comments: Normal speech and mentation   Psychiatric:         Judgment: Judgment normal.         ED Course                 [facility]    -Laceration Repair    Date/Time: 7/26/2023 3:19 PM    Performed by: Kalin Randolph MD  Authorized by: Kalin Randolph MD    Risks, benefits and alternatives discussed.      ANESTHESIA (see MAR for exact dosages):     Anesthesia method:  None  LACERATION DETAILS     Location:  Scalp    Scalp location:  Crown    Length (cm):  2    REPAIR TYPE:     Repair type:  Simple      TREATMENT:     Area cleansed with:  Saline    SKIN REPAIR     Repair method:  Staples    Number of staples:  2    APPROXIMATION     Approximation:  Close    PROCEDURE    Patient Tolerance:  Patient tolerated the procedure well with no immediate complications                  No results found for this or any previous visit (from the past 24 hour(s)).    Medications - No data to display    Assessments & Plan (with Medical Decision Making)  47-year-old male with " scalp laceration.  Closed with staples as above.  Tetanus was already up-to-date.  No signs of intracranial injury.  Stable for discharge home.  Staples out in 7 to 10 days.     I have reviewed the nursing notes.                    New Prescriptions    No medications on file       Final diagnoses:   Scalp laceration, initial encounter       7/26/2023   [facility] EMERGENCY DEPT       Kalin Randolph MD  07/26/23 1525

## 2023-07-26 NOTE — ED TRIAGE NOTES
Pt has approximately 1.5 cm lac to top of head. Hit head on wood deck approximately 1 hour ago. No LOC, no blood thinners. Bleeding controlled  Last tdap 2/2023     Triage Assessment       Row Name 07/26/23 1448       Triage Assessment (Adult)    Airway WDL WDL       Respiratory WDL    Respiratory WDL WDL       Peripheral/Neurovascular WDL    Peripheral Neurovascular WDL WDL

## 2023-08-03 ENCOUNTER — ALLIED HEALTH/NURSE VISIT (OUTPATIENT)
Dept: FAMILY MEDICINE | Facility: CLINIC | Age: 48
End: 2023-08-03
Payer: COMMERCIAL

## 2023-08-03 DIAGNOSIS — Z48.02 ENCOUNTER FOR STAPLE REMOVAL: Primary | ICD-10-CM

## 2023-08-03 PROCEDURE — 99207 PR NO CHARGE NURSE ONLY: CPT

## 2023-08-03 NOTE — PROGRESS NOTES
Maverick Youngblood presents to the clinic today for removal of sutures and staples.  The patient has had the sutures and staples in place for 8 days.  There has been no history of infection or drainage.  2 sutures and staples are seen located on the Scalp - Crown of Head.  The wound is healing well with no signs of infection.  Tetanus status is up to date.   All sutures and staples were easily removed today.  Routine wound care discussed.  The patient will follow up as needed.    Candice Adrian RN

## 2024-03-16 ENCOUNTER — HEALTH MAINTENANCE LETTER (OUTPATIENT)
Age: 49
End: 2024-03-16

## 2024-05-07 DIAGNOSIS — E78.5 HYPERLIPIDEMIA LDL GOAL <130: ICD-10-CM

## 2024-05-08 RX ORDER — ROSUVASTATIN CALCIUM 40 MG/1
40 TABLET, COATED ORAL DAILY
Qty: 90 TABLET | Refills: 0 | Status: SHIPPED | OUTPATIENT
Start: 2024-05-08

## 2024-05-08 NOTE — TELEPHONE ENCOUNTER
Medication refilled x1.  Needs appointment for further refills.  Will have staff notify patient, and if appointment is made, next refill will cover patient to the date of the scheduled appointment.    Quirino Meyers MD

## 2024-05-08 NOTE — TELEPHONE ENCOUNTER
Patient has been notified of the note below via [employer]. Reminder set for 3 days to send letter if not read.

## 2024-08-25 ENCOUNTER — HOSPITAL ENCOUNTER (EMERGENCY)
Facility: CLINIC | Age: 49
Discharge: HOME | End: 2024-08-25
Attending: FAMILY MEDICINE | Admitting: FAMILY MEDICINE
Payer: COMMERCIAL

## 2024-08-25 VITALS
HEIGHT: 70 IN | BODY MASS INDEX: 37.22 KG/M2 | RESPIRATION RATE: 18 BRPM | DIASTOLIC BLOOD PRESSURE: 93 MMHG | HEART RATE: 79 BPM | TEMPERATURE: 98.6 F | WEIGHT: 260 LBS | OXYGEN SATURATION: 100 % | SYSTOLIC BLOOD PRESSURE: 137 MMHG

## 2024-08-25 DIAGNOSIS — M54.12 CERVICAL RADICULOPATHY: ICD-10-CM

## 2024-08-25 PROCEDURE — 99282 EMERGENCY DEPT VISIT SF MDM: CPT | Performed by: FAMILY MEDICINE

## 2024-08-25 PROCEDURE — 99283 EMERGENCY DEPT VISIT LOW MDM: CPT | Performed by: FAMILY MEDICINE

## 2024-08-25 PROCEDURE — 99284 EMERGENCY DEPT VISIT MOD MDM: CPT | Performed by: FAMILY MEDICINE

## 2024-08-25 RX ORDER — METHYLPREDNISOLONE 4 MG
TABLET, DOSE PACK ORAL
Qty: 21 TABLET | Refills: 0 | Status: SHIPPED | OUTPATIENT
Start: 2024-08-25

## 2024-08-25 RX ORDER — OXYCODONE HYDROCHLORIDE 5 MG/1
5 TABLET ORAL EVERY 6 HOURS PRN
Qty: 6 TABLET | Refills: 0 | Status: SHIPPED | OUTPATIENT
Start: 2024-08-25

## 2024-08-25 ASSESSMENT — COLUMBIA-SUICIDE SEVERITY RATING SCALE - C-SSRS
6. HAVE YOU EVER DONE ANYTHING, STARTED TO DO ANYTHING, OR PREPARED TO DO ANYTHING TO END YOUR LIFE?: NO
1. IN THE PAST MONTH, HAVE YOU WISHED YOU WERE DEAD OR WISHED YOU COULD GO TO SLEEP AND NOT WAKE UP?: NO
2. HAVE YOU ACTUALLY HAD ANY THOUGHTS OF KILLING YOURSELF IN THE PAST MONTH?: NO

## 2024-08-26 NOTE — ED TRIAGE NOTES
L neck pain that is causing decreased ROM and shooting pain down L arm. Worsening pain over last few days but has been going on for a while now, couple weeks.      Triage Assessment (Adult)       Row Name 08/25/24 2024          Triage Assessment    Airway WDL WDL        Respiratory WDL    Respiratory WDL WDL        Cardiac WDL    Cardiac WDL WDL

## 2024-08-26 NOTE — ED PROVIDER NOTES
History     Chief Complaint   Patient presents with    Shoulder Pain     HPI  Maverick Youngblood is a 48 year old male who presents with left-sided neck and shoulder pains been going on for the past couple of weeks.  Is gotten really bad in the last few days with had a lot of trouble sleeping.  Patient states that the pain is worse if he turns his head to the left, the pain symptoms that shoot down his arm.  Patient has not noticed any weakness in the arm, denies any numbness or tingling in the fingertips.  Patient has been trying Aleve and ibuprofen with no effect.    Allergies:  No Known Allergies    Problem List:    Patient Active Problem List    Diagnosis Date Noted    Chronic right shoulder pain 07/14/2021     Priority: Medium    Pain in right acromioclavicular joint 06/30/2021     Priority: Medium    Rotator cuff syndrome, right 06/30/2021     Priority: Medium    Tendonitis of upper biceps tendon of right shoulder 06/30/2021     Priority: Medium    Subacromial impingement of right shoulder 03/25/2021     Priority: Medium    Severe obesity (BMI 35.0-39.9) with comorbidity (H) 01/18/2016     Priority: Medium    Hyperlipidemia LDL goal <130 03/31/2011     Priority: Medium    Family history of ischemic heart disease 03/31/2011     Priority: Medium        Past Medical History:    History reviewed. No pertinent past medical history.    Past Surgical History:    History reviewed. No pertinent surgical history.    Family History:    Family History   Problem Relation Age of Onset    Lipids Mother     Lipids Maternal Grandmother     Unknown/Adopted Maternal Grandfather     Lipids Maternal Uncle     C.A.D. Maternal Uncle 36    Colon Cancer No family hx of        Social History:  Marital Status:   [2]  Social History     Tobacco Use    Smoking status: Former     Current packs/day: 0.00     Average packs/day: 1 pack/day for 10.0 years (10.0 ttl pk-yrs)     Types: Cigarettes     Start date: 8/30/1995     Quit date:  "2005     Years since quittin.0    Smokeless tobacco: Former   Vaping Use    Vaping status: Never Used   Substance Use Topics    Alcohol use: Yes     Comment: 2 drinks/week    Drug use: No        Medications:    rosuvastatin (CRESTOR) 40 MG tablet          Review of Systems   All other systems reviewed and are negative.      Physical Exam   BP: (!) 137/93  Pulse: 79  Temp: 98.6  F (37  C)  Resp: 18  Height: 177.8 cm (5' 10\")  Weight: 117.9 kg (260 lb)  SpO2: 100 %      Physical Exam  Vitals and nursing note reviewed.   Constitutional:       General: He is not in acute distress.     Appearance: Normal appearance. He is not ill-appearing.   Neck:      Trachea: Trachea normal.   Musculoskeletal:      Left shoulder: No swelling, deformity, effusion, laceration, tenderness, bony tenderness or crepitus. Normal range of motion. Normal strength. Normal pulse.      Cervical back: No signs of trauma. Pain with movement and muscular tenderness present. No spinous process tenderness. Decreased range of motion.      Comments: Patient has a positive Neer sign of the left shoulder and a positive Hawking sign   Neurological:      Mental Status: He is alert.         ED Course        Procedures        No results found for this or any previous visit (from the past 24 hour(s)).    Medications - No data to display    Patient's symptoms sound suspicious like cervical radiculopathy.  I think this is likely more from the neck versus the shoulder although he could have issues going on with that also.  Unfortunately we do not have MRI available here over the weekend, I discussed with the patient that we could get a CAT scan of the neck which may show an obvious disc herniation but would miss a lot of things.  My recommendation would be is to try the patient on a course of some oral steroids for the next few days, continue to use Tylenol and ibuprofen during the day and I will give some stronger pain pills to use at night to help him " sleep.  If things do not better he should follow-up with his doctor to consider getting an outpatient MRI of his neck and possible consideration of the shoulder also.  Patient was good with this plan and will be discharged at this time.  He will follow-up with his doctor if things or not improving in the next 3 to 4 days.    Assessments & Plan (with Medical Decision Making)  Cervical radiculopathy     I have reviewed the nursing notes.    I have reviewed the findings, diagnosis, plan and need for follow up with the patient.        8/25/2024   [facility] EMERGENCY DEPT       Jose Arce MD  08/25/24 2039

## 2024-12-10 ENCOUNTER — TELEPHONE (OUTPATIENT)
Dept: FAMILY MEDICINE | Facility: CLINIC | Age: 49
End: 2024-12-10
Payer: COMMERCIAL

## 2024-12-10 ENCOUNTER — DOCUMENTATION ONLY (OUTPATIENT)
Dept: FAMILY MEDICINE | Facility: OTHER | Age: 49
End: 2024-12-10
Payer: COMMERCIAL

## 2024-12-10 DIAGNOSIS — Z12.11 COLON CANCER SCREENING: ICD-10-CM

## 2024-12-10 DIAGNOSIS — D69.6 THROMBOCYTOPENIA (H): Primary | ICD-10-CM

## 2024-12-10 DIAGNOSIS — E78.5 HYPERLIPIDEMIA LDL GOAL <130: ICD-10-CM

## 2024-12-10 DIAGNOSIS — Z13.220 LIPID SCREENING: ICD-10-CM

## 2024-12-10 NOTE — TELEPHONE ENCOUNTER
Pt states he was told to complete labs pre-physical appt on 12/19.   Lab appt scheduled on 12/15 but there are no orders on file.   Pt stated labs were scheduled pre-physical appt so he could have his Rosuvastatin Rx refilled;   Per chart review, above medication was filled 12/6. Pt was informed of this and instructed to call his pharmacy to arrange for .   Please advise regarding lab orders and if they must be done pre-physical appt or can be completed the day of.     Thank you,  Caitlyn HANSEN RN

## 2024-12-10 NOTE — PROGRESS NOTES
Maverick Youngblood has an upcoming lab appointment:    Future Appointments   Date Time Provider Department Center   12/15/2024  8:30 AM PH LAB [facility]   12/19/2024  3:30 PM Georgi Zuniga DO [facility]     Patient is scheduled for the following lab(s) patient have up coming lab please place ordered if needed     There is no order available. Please review and place either future orders or HMPO (Review of Health Maintenance Protocol Orders), as appropriate.    Health Maintenance Due   Topic    LIPID      KRISS NICHOLAS

## 2024-12-12 NOTE — TELEPHONE ENCOUNTER
Called and LVM for pt to call back. Please relay message below per JADE.    Alexandra Dalton MA

## 2024-12-12 NOTE — TELEPHONE ENCOUNTER
Patient has labs on order from the provider that he is seeing on 12/19/2024.    Please contact patient and let him know that he if he prefers to switch from the provider he scheduled with on 12/19/2024 to see me instead, I have openings over Christmas break.    I would prefer a 40-minute block of time, okay to use [facility]REG[facility] spots anywhere in schedule at the end of December in order to do so but also the first appointment on Monday, 12/30 would be fine to book a 40-minute appointment in the 20-minute slot that is allotted.    Will have staff notify patient.    Quirino Meyers MD

## 2024-12-17 NOTE — TELEPHONE ENCOUNTER
I have scheduled patient with you? Do you want patient to use the pending orders from other provider? Or do you want to put in knew orders for patient or wait to have orders put in after you see him.      Dina Song MA 12/17/2024

## 2024-12-17 NOTE — TELEPHONE ENCOUNTER
I placed the orders for the patient.  He can come early 15 to 20 minutes for his appointment with me that day or make a separate lab appointment on a different day to have these done so we have the results during her visit together.    Left staff notify patient.    Quirino Meyers MD

## 2024-12-30 ENCOUNTER — OFFICE VISIT (OUTPATIENT)
Dept: FAMILY MEDICINE | Facility: CLINIC | Age: 49
End: 2024-12-30
Payer: COMMERCIAL

## 2024-12-30 ENCOUNTER — HOSPITAL ENCOUNTER (OUTPATIENT)
Dept: GENERAL RADIOLOGY | Facility: CLINIC | Age: 49
Discharge: HOME | End: 2024-12-30
Attending: FAMILY MEDICINE | Admitting: FAMILY MEDICINE
Payer: COMMERCIAL

## 2024-12-30 VITALS
OXYGEN SATURATION: 96 % | TEMPERATURE: 98.8 F | HEART RATE: 87 BPM | RESPIRATION RATE: 18 BRPM | BODY MASS INDEX: 38.08 KG/M2 | SYSTOLIC BLOOD PRESSURE: 130 MMHG | DIASTOLIC BLOOD PRESSURE: 86 MMHG | WEIGHT: 266 LBS | HEIGHT: 70 IN

## 2024-12-30 DIAGNOSIS — M25.512 ARTHRALGIA OF LEFT ACROMIOCLAVICULAR JOINT: ICD-10-CM

## 2024-12-30 DIAGNOSIS — E78.00 HYPERCHOLESTEROLEMIA WITH LDL GREATER THAN 190 MG/DL: ICD-10-CM

## 2024-12-30 DIAGNOSIS — M75.42 IMPINGEMENT SYNDROME OF SHOULDER REGION, LEFT: ICD-10-CM

## 2024-12-30 DIAGNOSIS — Z12.11 COLON CANCER SCREENING: ICD-10-CM

## 2024-12-30 DIAGNOSIS — Z00.00 ROUTINE GENERAL MEDICAL EXAMINATION AT A HEALTH CARE FACILITY: Primary | ICD-10-CM

## 2024-12-30 DIAGNOSIS — E66.01 SEVERE OBESITY (BMI 35.0-39.9) WITH COMORBIDITY (H): ICD-10-CM

## 2024-12-30 PROBLEM — M25.511 PAIN IN RIGHT ACROMIOCLAVICULAR JOINT: Status: RESOLVED | Noted: 2021-06-30 | Resolved: 2024-12-30

## 2024-12-30 PROBLEM — M75.41 SUBACROMIAL IMPINGEMENT OF RIGHT SHOULDER: Status: RESOLVED | Noted: 2021-03-25 | Resolved: 2024-12-30

## 2024-12-30 PROBLEM — M75.101 ROTATOR CUFF SYNDROME, RIGHT: Status: RESOLVED | Noted: 2021-06-30 | Resolved: 2024-12-30

## 2024-12-30 PROBLEM — M75.21 TENDONITIS OF UPPER BICEPS TENDON OF RIGHT SHOULDER: Status: RESOLVED | Noted: 2021-06-30 | Resolved: 2024-12-30

## 2024-12-30 LAB
CHOLEST SERPL-MCNC: 245 MG/DL
FASTING STATUS PATIENT QL REPORTED: YES
FASTING STATUS PATIENT QL REPORTED: YES
GLUCOSE SERPL-MCNC: 90 MG/DL (ref 70–99)
HDLC SERPL-MCNC: 60 MG/DL
LDLC SERPL CALC-MCNC: 161 MG/DL
NONHDLC SERPL-MCNC: 185 MG/DL
TRIGL SERPL-MCNC: 122 MG/DL

## 2024-12-30 PROCEDURE — 80061 LIPID PANEL: CPT | Performed by: FAMILY MEDICINE

## 2024-12-30 PROCEDURE — 90471 IMMUNIZATION ADMIN: CPT | Performed by: FAMILY MEDICINE

## 2024-12-30 PROCEDURE — 90636 HEP A/HEP B VACC ADULT IM: CPT | Performed by: FAMILY MEDICINE

## 2024-12-30 PROCEDURE — 99396 PREV VISIT EST AGE 40-64: CPT | Mod: 25 | Performed by: FAMILY MEDICINE

## 2024-12-30 PROCEDURE — 82947 ASSAY GLUCOSE BLOOD QUANT: CPT | Performed by: FAMILY MEDICINE

## 2024-12-30 PROCEDURE — 73030 X-RAY EXAM OF SHOULDER: CPT | Mod: LT

## 2024-12-30 PROCEDURE — 99214 OFFICE O/P EST MOD 30 MIN: CPT | Mod: 25 | Performed by: FAMILY MEDICINE

## 2024-12-30 PROCEDURE — 36415 COLL VENOUS BLD VENIPUNCTURE: CPT | Performed by: FAMILY MEDICINE

## 2024-12-30 RX ORDER — ROSUVASTATIN CALCIUM 40 MG/1
40 TABLET, COATED ORAL DAILY
Qty: 90 TABLET | Refills: 4 | Status: SHIPPED | OUTPATIENT
Start: 2024-12-30

## 2024-12-30 SDOH — HEALTH STABILITY: PHYSICAL HEALTH: ON AVERAGE, HOW MANY DAYS PER WEEK DO YOU ENGAGE IN MODERATE TO STRENUOUS EXERCISE (LIKE A BRISK WALK)?: 5 DAYS

## 2024-12-30 ASSESSMENT — SOCIAL DETERMINANTS OF HEALTH (SDOH): HOW OFTEN DO YOU GET TOGETHER WITH FRIENDS OR RELATIVES?: MORE THAN THREE TIMES A WEEK

## 2024-12-30 NOTE — PROGRESS NOTES
"Preventive Care Visit  [facility]  Quirino Meyers MD, Family Medicine  Dec 30, 2024  {Provider  Link to [facility] :[id number]}    Assessment & Plan     ASSESSMENT/ORDERS:    ICD-10-CM    1. Routine general medical examination at a health care facility  Z00.00 Glucose     Lipid panel reflex to direct LDL Fasting      2. Hypercholesterolemia with LDL greater than 190 mg/dL  E78.00 rosuvastatin (CRESTOR) 40 MG tablet      3. Severe obesity (BMI 35.0-39.9) with comorbidity (H)  E66.01       4. Impingement syndrome of shoulder region, left  M75.42 XR Shoulder Left G/E 3 Views     Physical Therapy  Referral      5. Arthralgia of left acromioclavicular joint  M25.512 XR Shoulder Left G/E 3 Views     Physical Therapy  Referral      6. Colon cancer screening  Z12.11 Fecal colorectal cancer screen (FIT)        PLAN:  Continue on 40 mg rosuvastatin for stable hyperlipidemia.  Lipid ordered today.  Left shoulder impingement with possible AC arthritis.  X-ray ordered today for further evaluation.  Physical therapy referral for now.  If x-ray shows severe arthritis or if he does not get adequate initial progression and improvement in symptoms with physical therapy, will consider a corticosteroid injection of the AC joint and or subacromial space of the shoulder.  Discussed impact severe obesity has on lipid management.  Importance of weight loss discussed and diet/exercise strategies recommended.     {Patient advised of split billing (Optional):274778}        BMI  Estimated body mass index is 38.17 kg/m  as calculated from the following:    Height as of this encounter: 1.778 m (5' 10\").    Weight as of this encounter: 120.7 kg (266 lb).   {Weight Management Plan needed for ACO:038557}    Counseling  Appropriate preventive services were addressed with this patient via screening, questionnaire, or discussion as appropriate for fall prevention, nutrition, physical activity, " Tobacco-use cessation, social engagement, weight loss and cognition.  Checklist reviewing preventive services available has been given to the patient.  Reviewed patient's diet, addressing concerns and/or questions.   The patient was instructed to see the dentist every 6 months.       {FOLLOW UP PLANS (Optional) Includes COVID19 Treatment Plan:656319}    Jorge Che is a 49 year old, presenting for the following:  Physical (Wellness exam )        12/30/2024    12:47 PM   Additional Questions   Roomed by Carmen WOLFF  In addition to today's well visit, patient would like to address his chronic left shoulder pain.  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  He previously had right shoulder pain that was worked up by orthopedics 3 years ago and improved with physical therapy and to corticoid steroid injections.  He would like to workup his left shoulder pain today that he notes is worse when he sleeps and lays on it for long periods of time or when he does repetitive work.  No known injury to the shoulder.  He has full range of motion except has pain and an arc from about 90 to 120 degrees.    Patient has stable hyperlipidemia is on rosuvastatin for his LDL greater than 190.  Due for cholesterol.  Has not had blood sugar screened in the last 3 years.  {MA/LPN/RN Pre-Provider Visit Orders- hCG/UA/Strep (Optional):646065}  {SUPERLIST (Optional):038292}  {additonal problems for provider to add (Optional):325707}  Health Care Directive  Patient does not have a Health Care Directive: Discussed advance care planning with patient; information given to patient to review.      12/30/2024   General Health   How would you rate your overall physical health? Good   Feel stress (tense, anxious, or unable to sleep) Not at all         12/30/2024   Nutrition   Three or more servings of calcium each day? (!) I DON'T KNOW   Diet: Regular (no restrictions)   How many servings of fruit  and vegetables per day? (!) 0-1   How many sweetened beverages each day? 0-1         2024   Exercise   Days per week of moderate/strenous exercise 5 days         2024   Social Factors   Frequency of gathering with friends or relatives More than three times a week         2024   Dental   Dentist two times every year? (!) NO            Today's PHQ-2 Score:       2024    12:54 PM   PHQ-2 (  [employer])   PHQ-2 Score Incomplete           2024   Substance Use   Alcohol more than 3/day or more than 7/wk No   Do you use any other substances recreationally? No     Social History     Tobacco Use    Smoking status: Former     Current packs/day: 0.00     Average packs/day: 1 pack/day for 10.0 years (10.0 ttl pk-yrs)     Types: Cigarettes     Start date: 1995     Quit date: 2005     Years since quittin.3    Smokeless tobacco: Former   Vaping Use    Vaping status: Never Used   Substance Use Topics    Alcohol use: Yes     Comment: 2 drinks/week    Drug use: No     {Provider  If there are gaps in the social history shown above, please follow the link to update and then refresh the note Link to Social and Substance History :796875}  ASCVD Risk   The 10-year ASCVD risk score (Marisabel HARPER, et al., 2019) is: 2.5%    Values used to calculate the score:      Age: 49 years      Sex: Male      Is Non- : No      Diabetic: No      Tobacco smoker: No      Systolic Blood Pressure: 130 mmHg      Is BP treated: No      HDL Cholesterol: 48 mg/dL      Total Cholesterol: 177 mg/dL         No data to display              {Provider  REQUIRED FOR AWV Use the storyboard to review patient history, after sections have been marked as reviewed, refresh note to capture documentation:102887}   Reviewed and updated as needed this visit by Provider   Tobacco  Allergies  Meds  Problems  Med Hx  Surg Hx  Fam Hx            {HISTORY OPTIONS (Optional):670166}    {ROS Picklists  "(Optional):494464}     Objective    Exam  /86   Pulse 87   Temp 98.8  F (37.1  C) (Temporal)   Resp 18   Ht 1.778 m (5' 10\")   Wt 120.7 kg (266 lb)   SpO2 96%   BMI 38.17 kg/m     Estimated body mass index is 38.17 kg/m  as calculated from the following:    Height as of this encounter: 1.778 m (5' 10\").    Weight as of this encounter: 120.7 kg (266 lb).    Physical Exam  Constitutional:       General: He is not in acute distress.     Appearance: He is well-developed.   HENT:      Head: Normocephalic and atraumatic.      Right Ear: Hearing, tympanic membrane, ear canal and external ear normal.      Left Ear: Hearing, tympanic membrane, ear canal and external ear normal.      Nose: Nose normal.      Mouth/Throat:      Mouth: No oral lesions.      Pharynx: Uvula midline. No oropharyngeal exudate.   Eyes:      General: Lids are normal. No scleral icterus.        Right eye: No discharge.         Left eye: No discharge.      Extraocular Movements: Extraocular movements intact.      Conjunctiva/sclera: Conjunctivae normal.      Pupils: Pupils are equal, round, and reactive to light.   Neck:      Thyroid: No thyroid mass or thyromegaly.      Trachea: No tracheal deviation.   Cardiovascular:      Rate and Rhythm: Normal rate and regular rhythm.      Pulses: Normal pulses.      Heart sounds: Normal heart sounds, S1 normal and S2 normal. No murmur heard.     No S3 or S4 sounds.   Pulmonary:      Effort: Pulmonary effort is normal. No respiratory distress.      Breath sounds: Normal breath sounds. No wheezing or rales.   Abdominal:      General: Bowel sounds are normal. There is no distension.      Palpations: Abdomen is soft. There is no mass.      Tenderness: There is no abdominal tenderness. There is no guarding.   Musculoskeletal:         General: No deformity.      Cervical back: Normal range of motion and neck supple.   Lymphadenopathy:      Cervical: No cervical adenopathy.      Upper Body:      Right upper " body: No supraclavicular adenopathy.      Left upper body: No supraclavicular adenopathy.   Skin:     General: Skin is warm and dry.      Findings: No lesion or rash.   Neurological:      Mental Status: He is alert and oriented to person, place, and time.      Motor: No abnormal muscle tone.      Deep Tendon Reflexes: Reflexes are normal and symmetric.   Psychiatric:         Speech: Speech normal.         Thought Content: Thought content normal.         Judgment: Judgment normal.       Right Shoulder Exam     Tenderness   The patient is experiencing tenderness in the acromioclavicular joint and biceps tendon.    Range of Motion   Right shoulder active abduction: Pain at about 80 to 120 degree arc.   Passive abduction:  normal   Right shoulder forward flexion: Pain at all degrees of range of motion.   Internal rotation 90 degrees:  abnormal Right shoulder internal rotation 90 degrees: Painful and limited reach behind the back.    Muscle Strength   The patient has normal right shoulder strength (Pain with resistant abduction and forward flexion).    Tests   Woo test: positive  Cross arm: positive  Impingement: positive                 Signed Electronically by: Quirino Meyers MD  {Email feedback regarding this note to [email or web address]   :[id number]}

## 2024-12-30 NOTE — NURSING NOTE
Prior to immunization administration, verified patients identity using patient s name and date of birth. Please see Immunization Activity for additional information.     Screening Questionnaire for Adult Immunization    Are you sick today?   No   Do you have allergies to medications, food, a vaccine component or latex?   No   Have you ever had a serious reaction after receiving a vaccination?   No   Do you have a long-term health problem with heart, lung, kidney, or metabolic disease (e.g., diabetes), asthma, a blood disorder, no spleen, complement component deficiency, a cochlear implant, or a spinal fluid leak?  Are you on long-term aspirin therapy?   No   Do you have cancer, leukemia, HIV/AIDS, or any other immune system problem?   No   Do you have a parent, brother, or sister with an immune system problem?   No   In the past 3 months, have you taken medications that affect  your immune system, such as prednisone, other steroids, or anticancer drugs; drugs for the treatment of rheumatoid arthritis, Crohn s disease, or psoriasis; or have you had radiation treatments?   No   Have you had a seizure, or a brain or other nervous system problem?   No   During the past year, have you received a transfusion of blood or blood    products, or been given immune (gamma) globulin or antiviral drug?   No   For women: Are you pregnant or is there a chance you could become       pregnant during the next month?   No   Have you received any vaccinations in the past 4 weeks?   No     Immunization questionnaire answers were all negative.      Patient instructed to remain in clinic for 15 minutes afterwards, and to report any adverse reactions.     Screening performed by Lora Edge CMA on 12/30/2024 at 1:59 PM.

## 2024-12-30 NOTE — PATIENT INSTRUCTIONS
Patient Education   Preventive Care Advice   This is general advice given by our system to help you stay healthy. However, your care team may have specific advice just for you. Please talk to your care team about your preventive care needs.  Nutrition  Eat 5 or more servings of fruits and vegetables each day.  Try wheat bread, brown rice and whole grain pasta (instead of white bread, rice, and pasta).  Get enough calcium and vitamin D. Check the label on foods and aim for 100% of the RDA (recommended daily allowance).  Lifestyle  Exercise at least 150 minutes each week  (30 minutes a day, 5 days a week).  Do muscle strengthening activities 2 days a week. These help control your weight and prevent disease.  No smoking.  Wear sunscreen to prevent skin cancer.  Have a dental exam and cleaning every 6 months.  Yearly exams  See your health care team every year to talk about:  Any changes in your health.  Any medicines your care team has prescribed.  Preventive care, family planning, and ways to prevent chronic diseases.  Shots (vaccines)   HPV shots (up to age 26), if you've never had them before.  Hepatitis B shots (up to age 59), if you've never had them before.  COVID-19 shot: Get this shot when it's due.  Flu shot: Get a flu shot every year.  Tetanus shot: Get a tetanus shot every 10 years.  Pneumococcal, hepatitis A, and RSV shots: Ask your care team if you need these based on your risk.  Shingles shot (for age 50 and up)  General health tests  Diabetes screening:  Starting at age 35, Get screened for diabetes at least every 3 years.  If you are younger than age 35, ask your care team if you should be screened for diabetes.  Cholesterol test: At age 39, start having a cholesterol test every 5 years, or more often if advised.  Bone density scan (DEXA): At age 50, ask your care team if you should have this scan for osteoporosis (brittle bones).  Hepatitis C: Get tested at least once in your life.  STIs (sexually  transmitted infections)  Before age 24: Ask your care team if you should be screened for STIs.  After age 24: Get screened for STIs if you're at risk. You are at risk for STIs (including HIV) if:  You are sexually active with more than one person.  You don't use condoms every time.  You or a partner was diagnosed with a sexually transmitted infection.  If you are at risk for HIV, ask about PrEP medicine to prevent HIV.  Get tested for HIV at least once in your life, whether you are at risk for HIV or not.  Cancer screening tests  Cervical cancer screening: If you have a cervix, begin getting regular cervical cancer screening tests starting at age 21.  Breast cancer scan (mammogram): If you've ever had breasts, begin having regular mammograms starting at age 40. This is a scan to check for breast cancer.  Colon cancer screening: It is important to start screening for colon cancer at age 45.  Have a colonoscopy test every 10 years (or more often if you're at risk) Or, ask your provider about stool tests like a FIT test every year or Cologuard test every 3 years.  To learn more about your testing options, visit:   .  For help making a decision, visit:   https://bit.ly/kt65927.  Prostate cancer screening test: If you have a prostate, ask your care team if a prostate cancer screening test (PSA) at age 55 is right for you.  Lung cancer screening: If you are a current or former smoker ages 50 to 80, ask your care team if ongoing lung cancer screenings are right for you.  For informational purposes only. Not to replace the advice of your health care provider. Copyright   2023 [employer]. All rights reserved. Clinically reviewed by the [facility]. GLAMSQUAD 098561 - REV 01/24.

## 2025-08-23 PROCEDURE — 82274 ASSAY TEST FOR BLOOD FECAL: CPT | Performed by: FAMILY MEDICINE

## 2025-08-25 ENCOUNTER — TELEPHONE (OUTPATIENT)
Dept: FAMILY MEDICINE | Facility: CLINIC | Age: 50
End: 2025-08-25
Payer: COMMERCIAL

## 2025-08-25 DIAGNOSIS — Z12.11 COLON CANCER SCREENING: Primary | ICD-10-CM

## 2025-08-26 ENCOUNTER — LAB (OUTPATIENT)
Dept: LAB | Facility: CLINIC | Age: 50
End: 2025-08-26
Payer: COMMERCIAL

## 2025-08-26 DIAGNOSIS — Z12.11 COLON CANCER SCREENING: ICD-10-CM

## 2025-08-27 LAB — HEMOCCULT STL QL IA: NEGATIVE
